# Patient Record
Sex: FEMALE | Race: WHITE | NOT HISPANIC OR LATINO
[De-identification: names, ages, dates, MRNs, and addresses within clinical notes are randomized per-mention and may not be internally consistent; named-entity substitution may affect disease eponyms.]

---

## 2016-03-12 RX ORDER — WARFARIN SODIUM 2.5 MG/1
1 TABLET ORAL
Qty: 0 | Refills: 0 | COMMUNITY
Start: 2016-03-12

## 2017-12-01 ENCOUNTER — APPOINTMENT (OUTPATIENT)
Dept: UROGYNECOLOGY | Facility: CLINIC | Age: 82
End: 2017-12-01
Payer: MEDICARE

## 2017-12-01 VITALS
DIASTOLIC BLOOD PRESSURE: 70 MMHG | HEIGHT: 62 IN | WEIGHT: 155 LBS | BODY MASS INDEX: 28.52 KG/M2 | SYSTOLIC BLOOD PRESSURE: 152 MMHG

## 2017-12-01 PROCEDURE — 99204 OFFICE O/P NEW MOD 45 MIN: CPT | Mod: 25

## 2017-12-01 PROCEDURE — 51701 INSERT BLADDER CATHETER: CPT

## 2017-12-04 ENCOUNTER — RECORD ABSTRACTING (OUTPATIENT)
Age: 82
End: 2017-12-04

## 2017-12-04 DIAGNOSIS — Z82.49 FAMILY HISTORY OF ISCHEMIC HEART DISEASE AND OTHER DISEASES OF THE CIRCULATORY SYSTEM: ICD-10-CM

## 2017-12-04 DIAGNOSIS — Z87.39 PERSONAL HISTORY OF OTHER DISEASES OF THE MUSCULOSKELETAL SYSTEM AND CONNECTIVE TISSUE: ICD-10-CM

## 2017-12-04 DIAGNOSIS — I10 ESSENTIAL (PRIMARY) HYPERTENSION: ICD-10-CM

## 2017-12-04 DIAGNOSIS — Z37.9 ENCOUNTER FOR FULL-TERM UNCOMPLICATED DELIVERY: ICD-10-CM

## 2017-12-04 DIAGNOSIS — Z82.5 FAMILY HISTORY OF ASTHMA AND OTHER CHRONIC LOWER RESPIRATORY DISEASES: ICD-10-CM

## 2017-12-04 DIAGNOSIS — I51.9 HEART DISEASE, UNSPECIFIED: ICD-10-CM

## 2017-12-04 DIAGNOSIS — Z83.511 FAMILY HISTORY OF GLAUCOMA: ICD-10-CM

## 2017-12-04 DIAGNOSIS — Z82.3 FAMILY HISTORY OF STROKE: ICD-10-CM

## 2017-12-04 DIAGNOSIS — K46.9 UNSPECIFIED ABDOMINAL HERNIA W/OUT OBSTRUCTION OR GANGRENE: ICD-10-CM

## 2017-12-04 DIAGNOSIS — Z87.09 PERSONAL HISTORY OF OTHER DISEASES OF THE RESPIRATORY SYSTEM: ICD-10-CM

## 2017-12-04 DIAGNOSIS — Z78.9 OTHER SPECIFIED HEALTH STATUS: ICD-10-CM

## 2017-12-04 LAB
APPEARANCE: CLEAR
BACTERIA UR CULT: NORMAL
BACTERIA: NEGATIVE
BILIRUB UR QL STRIP: NORMAL
BILIRUBIN URINE: NEGATIVE
BLOOD URINE: NEGATIVE
CLARITY UR: CLEAR
COLLECTION METHOD: NORMAL
COLOR: YELLOW
GLUCOSE QUALITATIVE U: NEGATIVE MG/DL
GLUCOSE UR-MCNC: NORMAL
HCG UR QL: 0.2 EU/DL
HGB UR QL STRIP.AUTO: NORMAL
HYALINE CASTS: 0 /LPF
KETONES UR-MCNC: NORMAL
KETONES URINE: NEGATIVE
LEUKOCYTE ESTERASE UR QL STRIP: NORMAL
LEUKOCYTE ESTERASE URINE: NEGATIVE
MICROSCOPIC-UA: NORMAL
NITRITE UR QL STRIP: NORMAL
NITRITE URINE: NEGATIVE
PH UR STRIP: 7.5
PH URINE: 7.5
PROT UR STRIP-MCNC: NORMAL
PROTEIN URINE: NEGATIVE MG/DL
RED BLOOD CELLS URINE: 1 /HPF
SP GR UR STRIP: 1.01
SPECIFIC GRAVITY URINE: 1.01
SQUAMOUS EPITHELIAL CELLS: 1 /HPF
UROBILINOGEN URINE: NEGATIVE MG/DL
WHITE BLOOD CELLS URINE: 0 /HPF

## 2017-12-04 RX ORDER — ACETAMINOPHEN 500 MG/1
TABLET ORAL
Refills: 0 | Status: ACTIVE | COMMUNITY

## 2017-12-04 RX ORDER — WARFARIN 4 MG/1
TABLET ORAL
Refills: 0 | Status: ACTIVE | COMMUNITY

## 2017-12-04 RX ORDER — FLUTICASONE PROPIONATE 110 UG/1
110 AEROSOL, METERED RESPIRATORY (INHALATION)
Refills: 0 | Status: ACTIVE | COMMUNITY

## 2017-12-04 RX ORDER — ATORVASTATIN CALCIUM 80 MG/1
TABLET, FILM COATED ORAL
Refills: 0 | Status: ACTIVE | COMMUNITY

## 2017-12-08 ENCOUNTER — APPOINTMENT (OUTPATIENT)
Dept: UROGYNECOLOGY | Facility: CLINIC | Age: 82
End: 2017-12-08
Payer: MEDICARE

## 2017-12-08 DIAGNOSIS — R39.11 HESITANCY OF MICTURITION: ICD-10-CM

## 2017-12-08 DIAGNOSIS — R33.9 RETENTION OF URINE, UNSPECIFIED: ICD-10-CM

## 2017-12-08 DIAGNOSIS — R35.0 FREQUENCY OF MICTURITION: ICD-10-CM

## 2017-12-08 PROCEDURE — 99214 OFFICE O/P EST MOD 30 MIN: CPT | Mod: GC

## 2017-12-19 ENCOUNTER — APPOINTMENT (OUTPATIENT)
Dept: UROGYNECOLOGY | Facility: CLINIC | Age: 82
End: 2017-12-19

## 2017-12-19 ENCOUNTER — INPATIENT (INPATIENT)
Facility: HOSPITAL | Age: 82
LOS: 4 days | Discharge: HOME | End: 2017-12-24
Attending: INTERNAL MEDICINE | Admitting: INTERNAL MEDICINE
Payer: MEDICARE

## 2017-12-19 VITALS
RESPIRATION RATE: 16 BRPM | TEMPERATURE: 100 F | SYSTOLIC BLOOD PRESSURE: 132 MMHG | OXYGEN SATURATION: 97 % | DIASTOLIC BLOOD PRESSURE: 77 MMHG | HEART RATE: 116 BPM

## 2017-12-19 DIAGNOSIS — A41.9 SEPSIS, UNSPECIFIED ORGANISM: ICD-10-CM

## 2017-12-19 DIAGNOSIS — K21.9 GASTRO-ESOPHAGEAL REFLUX DISEASE WITHOUT ESOPHAGITIS: ICD-10-CM

## 2017-12-19 DIAGNOSIS — I48.91 UNSPECIFIED ATRIAL FIBRILLATION: ICD-10-CM

## 2017-12-19 DIAGNOSIS — N93.9 ABNORMAL UTERINE AND VAGINAL BLEEDING, UNSPECIFIED: ICD-10-CM

## 2017-12-19 DIAGNOSIS — Z87.81 PERSONAL HISTORY OF (HEALED) TRAUMATIC FRACTURE: Chronic | ICD-10-CM

## 2017-12-19 DIAGNOSIS — J45.909 UNSPECIFIED ASTHMA, UNCOMPLICATED: ICD-10-CM

## 2017-12-19 DIAGNOSIS — I10 ESSENTIAL (PRIMARY) HYPERTENSION: ICD-10-CM

## 2017-12-19 DIAGNOSIS — R79.1 ABNORMAL COAGULATION PROFILE: ICD-10-CM

## 2017-12-19 DIAGNOSIS — N39.0 URINARY TRACT INFECTION, SITE NOT SPECIFIED: ICD-10-CM

## 2017-12-19 DIAGNOSIS — Z95.0 PRESENCE OF CARDIAC PACEMAKER: Chronic | ICD-10-CM

## 2017-12-19 LAB
ALBUMIN SERPL ELPH-MCNC: 4 G/DL — SIGNIFICANT CHANGE UP (ref 3.3–5)
ALP SERPL-CCNC: 85 U/L — SIGNIFICANT CHANGE UP (ref 40–120)
ALT FLD-CCNC: 18 U/L — SIGNIFICANT CHANGE UP (ref 4–33)
APPEARANCE UR: SIGNIFICANT CHANGE UP
APTT BLD: 49.4 SEC — HIGH (ref 27.5–37.4)
AST SERPL-CCNC: 26 U/L — SIGNIFICANT CHANGE UP (ref 4–32)
BACTERIA # UR AUTO: SIGNIFICANT CHANGE UP
BASE EXCESS BLDV CALC-SCNC: 0.5 MMOL/L — SIGNIFICANT CHANGE UP
BASOPHILS # BLD AUTO: 0.02 K/UL — SIGNIFICANT CHANGE UP (ref 0–0.2)
BASOPHILS NFR BLD AUTO: 0.2 % — SIGNIFICANT CHANGE UP (ref 0–2)
BILIRUB SERPL-MCNC: 0.4 MG/DL — SIGNIFICANT CHANGE UP (ref 0.2–1.2)
BILIRUB UR-MCNC: NEGATIVE — SIGNIFICANT CHANGE UP
BLOOD GAS VENOUS - CREATININE: 0.68 MG/DL — SIGNIFICANT CHANGE UP (ref 0.5–1.3)
BLOOD UR QL VISUAL: HIGH
BUN SERPL-MCNC: 12 MG/DL — SIGNIFICANT CHANGE UP (ref 7–23)
CALCIUM SERPL-MCNC: 8.9 MG/DL — SIGNIFICANT CHANGE UP (ref 8.4–10.5)
CHLORIDE BLDV-SCNC: 103 MMOL/L — SIGNIFICANT CHANGE UP (ref 96–108)
CHLORIDE SERPL-SCNC: 96 MMOL/L — LOW (ref 98–107)
CK MB BLD-MCNC: 1.38 NG/ML — SIGNIFICANT CHANGE UP (ref 1–4.7)
CK SERPL-CCNC: 71 U/L — SIGNIFICANT CHANGE UP (ref 25–170)
CO2 SERPL-SCNC: 23 MMOL/L — SIGNIFICANT CHANGE UP (ref 22–31)
COLOR SPEC: YELLOW — SIGNIFICANT CHANGE UP
CREAT SERPL-MCNC: 0.8 MG/DL — SIGNIFICANT CHANGE UP (ref 0.5–1.3)
EOSINOPHIL # BLD AUTO: 0.01 K/UL — SIGNIFICANT CHANGE UP (ref 0–0.5)
EOSINOPHIL NFR BLD AUTO: 0.1 % — SIGNIFICANT CHANGE UP (ref 0–6)
GAS PNL BLDV: 135 MMOL/L — LOW (ref 136–146)
GLUCOSE BLDV-MCNC: 116 — HIGH (ref 70–99)
GLUCOSE SERPL-MCNC: 107 MG/DL — HIGH (ref 70–99)
GLUCOSE UR-MCNC: NEGATIVE — SIGNIFICANT CHANGE UP
HCO3 BLDV-SCNC: 25 MMOL/L — SIGNIFICANT CHANGE UP (ref 20–27)
HCT VFR BLD CALC: 39.2 % — SIGNIFICANT CHANGE UP (ref 34.5–45)
HCT VFR BLDV CALC: 39.3 % — SIGNIFICANT CHANGE UP (ref 34.5–45)
HGB BLD-MCNC: 12.5 G/DL — SIGNIFICANT CHANGE UP (ref 11.5–15.5)
HGB BLDV-MCNC: 12.8 G/DL — SIGNIFICANT CHANGE UP (ref 11.5–15.5)
IMM GRANULOCYTES # BLD AUTO: 0.04 # — SIGNIFICANT CHANGE UP
IMM GRANULOCYTES NFR BLD AUTO: 0.4 % — SIGNIFICANT CHANGE UP (ref 0–1.5)
INR BLD: 5.01 — CRITICAL HIGH (ref 0.88–1.17)
KETONES UR-MCNC: NEGATIVE — SIGNIFICANT CHANGE UP
LACTATE BLDV-MCNC: 2.1 MMOL/L — HIGH (ref 0.5–2)
LEUKOCYTE ESTERASE UR-ACNC: HIGH
LIDOCAIN IGE QN: 24.5 U/L — SIGNIFICANT CHANGE UP (ref 7–60)
LYMPHOCYTES # BLD AUTO: 0.42 K/UL — LOW (ref 1–3.3)
LYMPHOCYTES # BLD AUTO: 4.5 % — LOW (ref 13–44)
MCHC RBC-ENTMCNC: 28.3 PG — SIGNIFICANT CHANGE UP (ref 27–34)
MCHC RBC-ENTMCNC: 31.9 % — LOW (ref 32–36)
MCV RBC AUTO: 88.7 FL — SIGNIFICANT CHANGE UP (ref 80–100)
MONOCYTES # BLD AUTO: 0.68 K/UL — SIGNIFICANT CHANGE UP (ref 0–0.9)
MONOCYTES NFR BLD AUTO: 7.3 % — SIGNIFICANT CHANGE UP (ref 2–14)
MUCOUS THREADS # UR AUTO: SIGNIFICANT CHANGE UP
NEUTROPHILS # BLD AUTO: 8.12 K/UL — HIGH (ref 1.8–7.4)
NEUTROPHILS NFR BLD AUTO: 87.5 % — HIGH (ref 43–77)
NITRITE UR-MCNC: POSITIVE — HIGH
NRBC # FLD: 0 — SIGNIFICANT CHANGE UP
PCO2 BLDV: 33 MMHG — LOW (ref 41–51)
PH BLDV: 7.47 PH — HIGH (ref 7.32–7.43)
PH UR: 6.5 — SIGNIFICANT CHANGE UP (ref 4.6–8)
PLATELET # BLD AUTO: 322 K/UL — SIGNIFICANT CHANGE UP (ref 150–400)
PMV BLD: 9.4 FL — SIGNIFICANT CHANGE UP (ref 7–13)
PO2 BLDV: 48 MMHG — HIGH (ref 35–40)
POTASSIUM BLDV-SCNC: 3.7 MMOL/L — SIGNIFICANT CHANGE UP (ref 3.4–4.5)
POTASSIUM SERPL-MCNC: 4.1 MMOL/L — SIGNIFICANT CHANGE UP (ref 3.5–5.3)
POTASSIUM SERPL-SCNC: 4.1 MMOL/L — SIGNIFICANT CHANGE UP (ref 3.5–5.3)
PROT SERPL-MCNC: 7.6 G/DL — SIGNIFICANT CHANGE UP (ref 6–8.3)
PROT UR-MCNC: 20 MG/DL — SIGNIFICANT CHANGE UP
PROTHROM AB SERPL-ACNC: 59.6 SEC — HIGH (ref 9.8–13.1)
RBC # BLD: 4.42 M/UL — SIGNIFICANT CHANGE UP (ref 3.8–5.2)
RBC # FLD: 13.5 % — SIGNIFICANT CHANGE UP (ref 10.3–14.5)
RBC CASTS # UR COMP ASSIST: HIGH (ref 0–?)
SAO2 % BLDV: 84.3 % — SIGNIFICANT CHANGE UP (ref 60–85)
SODIUM SERPL-SCNC: 136 MMOL/L — SIGNIFICANT CHANGE UP (ref 135–145)
SP GR SPEC: 1.01 — SIGNIFICANT CHANGE UP (ref 1–1.04)
TROPONIN T SERPL-MCNC: < 0.06 NG/ML — SIGNIFICANT CHANGE UP (ref 0–0.06)
UROBILINOGEN FLD QL: NORMAL MG/DL — SIGNIFICANT CHANGE UP
WBC # BLD: 9.29 K/UL — SIGNIFICANT CHANGE UP (ref 3.8–10.5)
WBC # FLD AUTO: 9.29 K/UL — SIGNIFICANT CHANGE UP (ref 3.8–10.5)
WBC CLUMPS #/AREA URNS HPF: PRESENT — HIGH (ref 0–?)
WBC UR QL: >50 — HIGH (ref 0–?)

## 2017-12-19 PROCEDURE — 71020: CPT | Mod: 26

## 2017-12-19 PROCEDURE — 99222 1ST HOSP IP/OBS MODERATE 55: CPT | Mod: GC

## 2017-12-19 RX ORDER — ACETAMINOPHEN 500 MG
650 TABLET ORAL ONCE
Qty: 0 | Refills: 0 | Status: COMPLETED | OUTPATIENT
Start: 2017-12-19 | End: 2017-12-19

## 2017-12-19 RX ORDER — ACETAMINOPHEN 500 MG
650 TABLET ORAL EVERY 6 HOURS
Qty: 0 | Refills: 0 | Status: DISCONTINUED | OUTPATIENT
Start: 2017-12-19 | End: 2017-12-24

## 2017-12-19 RX ORDER — VANCOMYCIN HCL 1 G
750 VIAL (EA) INTRAVENOUS EVERY 12 HOURS
Qty: 0 | Refills: 0 | Status: DISCONTINUED | OUTPATIENT
Start: 2017-12-19 | End: 2017-12-19

## 2017-12-19 RX ORDER — VANCOMYCIN HCL 1 G
1000 VIAL (EA) INTRAVENOUS ONCE
Qty: 0 | Refills: 0 | Status: COMPLETED | OUTPATIENT
Start: 2017-12-19 | End: 2017-12-19

## 2017-12-19 RX ORDER — VANCOMYCIN HCL 1 G
1000 VIAL (EA) INTRAVENOUS EVERY 12 HOURS
Qty: 0 | Refills: 0 | Status: DISCONTINUED | OUTPATIENT
Start: 2017-12-19 | End: 2017-12-19

## 2017-12-19 RX ORDER — FLUTICASONE PROPIONATE 220 MCG
1 AEROSOL WITH ADAPTER (GRAM) INHALATION
Qty: 0 | Refills: 0 | Status: DISCONTINUED | OUTPATIENT
Start: 2017-12-19 | End: 2017-12-24

## 2017-12-19 RX ORDER — IBUPROFEN 200 MG
400 TABLET ORAL ONCE
Qty: 0 | Refills: 0 | Status: COMPLETED | OUTPATIENT
Start: 2017-12-19 | End: 2017-12-19

## 2017-12-19 RX ORDER — ATORVASTATIN CALCIUM 80 MG/1
10 TABLET, FILM COATED ORAL AT BEDTIME
Qty: 0 | Refills: 0 | Status: DISCONTINUED | OUTPATIENT
Start: 2017-12-19 | End: 2017-12-24

## 2017-12-19 RX ORDER — ONDANSETRON 8 MG/1
4 TABLET, FILM COATED ORAL ONCE
Qty: 0 | Refills: 0 | Status: COMPLETED | OUTPATIENT
Start: 2017-12-19 | End: 2017-12-19

## 2017-12-19 RX ORDER — AZTREONAM 2 G
2000 VIAL (EA) INJECTION ONCE
Qty: 0 | Refills: 0 | Status: COMPLETED | OUTPATIENT
Start: 2017-12-19 | End: 2017-12-19

## 2017-12-19 RX ORDER — SODIUM CHLORIDE 9 MG/ML
1000 INJECTION INTRAMUSCULAR; INTRAVENOUS; SUBCUTANEOUS ONCE
Qty: 0 | Refills: 0 | Status: COMPLETED | OUTPATIENT
Start: 2017-12-19 | End: 2017-12-19

## 2017-12-19 RX ORDER — SODIUM CHLORIDE 9 MG/ML
1000 INJECTION INTRAMUSCULAR; INTRAVENOUS; SUBCUTANEOUS
Qty: 0 | Refills: 0 | Status: COMPLETED | OUTPATIENT
Start: 2017-12-19 | End: 2017-12-20

## 2017-12-19 RX ORDER — PANTOPRAZOLE SODIUM 20 MG/1
40 TABLET, DELAYED RELEASE ORAL
Qty: 0 | Refills: 0 | Status: DISCONTINUED | OUTPATIENT
Start: 2017-12-19 | End: 2017-12-24

## 2017-12-19 RX ORDER — AZTREONAM 2 G
1000 VIAL (EA) INJECTION EVERY 8 HOURS
Qty: 0 | Refills: 0 | Status: DISCONTINUED | OUTPATIENT
Start: 2017-12-19 | End: 2017-12-20

## 2017-12-19 RX ORDER — ONDANSETRON 8 MG/1
4 TABLET, FILM COATED ORAL EVERY 6 HOURS
Qty: 0 | Refills: 0 | Status: DISCONTINUED | OUTPATIENT
Start: 2017-12-19 | End: 2017-12-24

## 2017-12-19 RX ORDER — ACETAMINOPHEN 500 MG
1000 TABLET ORAL ONCE
Qty: 0 | Refills: 0 | Status: COMPLETED | OUTPATIENT
Start: 2017-12-19 | End: 2017-12-19

## 2017-12-19 RX ADMIN — Medication 400 MILLIGRAM(S): at 19:07

## 2017-12-19 RX ADMIN — Medication 650 MILLIGRAM(S): at 15:42

## 2017-12-19 RX ADMIN — SODIUM CHLORIDE 1000 MILLILITER(S): 9 INJECTION INTRAMUSCULAR; INTRAVENOUS; SUBCUTANEOUS at 10:08

## 2017-12-19 RX ADMIN — Medication 400 MILLIGRAM(S): at 10:42

## 2017-12-19 RX ADMIN — SODIUM CHLORIDE 1000 MILLILITER(S): 9 INJECTION INTRAMUSCULAR; INTRAVENOUS; SUBCUTANEOUS at 10:40

## 2017-12-19 RX ADMIN — ONDANSETRON 4 MILLIGRAM(S): 8 TABLET, FILM COATED ORAL at 10:52

## 2017-12-19 RX ADMIN — Medication 400 MILLIGRAM(S): at 18:16

## 2017-12-19 RX ADMIN — Medication 50 MILLIGRAM(S): at 22:42

## 2017-12-19 RX ADMIN — Medication 250 MILLIGRAM(S): at 10:41

## 2017-12-19 RX ADMIN — ATORVASTATIN CALCIUM 10 MILLIGRAM(S): 80 TABLET, FILM COATED ORAL at 22:42

## 2017-12-19 RX ADMIN — PANTOPRAZOLE SODIUM 40 MILLIGRAM(S): 20 TABLET, DELAYED RELEASE ORAL at 17:38

## 2017-12-19 RX ADMIN — SODIUM CHLORIDE 75 MILLILITER(S): 9 INJECTION INTRAMUSCULAR; INTRAVENOUS; SUBCUTANEOUS at 17:38

## 2017-12-19 RX ADMIN — SODIUM CHLORIDE 75 MILLILITER(S): 9 INJECTION INTRAMUSCULAR; INTRAVENOUS; SUBCUTANEOUS at 21:28

## 2017-12-19 RX ADMIN — Medication 50 MILLIGRAM(S): at 13:20

## 2017-12-19 NOTE — CONSULT NOTE ADULT - ASSESSMENT
82  with urosepsis, with light vaginal spotting which occurred prior to pessary removal 82  presenting with sepsis, possible  in origin. Denies any urinary complaints. Patient followed for prolapse by Dr. Preciado with recent pessary placement, followed by removal of pessary for vaginal bleeding. 82  presenting with fever, nausea, emesis and positive. Denies any urinary complaints. Patient followed for prolapse by Dr. Preciado with recent pessary placement, followed by removal of pessary for vaginal bleeding.       Fellow Note: Patient seen and examined. Agree with above. 81 y/o with multiple medical problems and uterovaginal prolapse presents to ED with  nausea, emesis, dizziness. She had a pessary placed ,  she had vaginal spotting and removed pessary, she had minimal vaginal bleeding since removal of pessary. She denies urinary complaints, denies frequency, urgency, incomplete emptying, dysuia., unclear if bleeding from urine or vagina.  This morning she called the office c/o of dizziness and was advised to go to the ED. Upon arrival to ED patient febrile, tachycardic with minimal vaginal bleeding, stable H/H, and positive u/a (by cath). Admitted for management of febrile UTI. On exam, benign abdomen, no CVA tenderness, vaginal exam posterior vaginal wall erosion, no active bleeding, urethral caruncle. Recommend treatment of UTI with admission to medicine.     Of note, when patient initially seen in office, unable to void with 350cc in bladder, able to void with pessary in place. Recommend patient manually reduce prolapse to be able to void. Recommend PVR check after void. If elevated PVR recommend indwelling catheter. Plan discussed with patient. All questions were answered. d/w Dr. Ozzy De Guzman

## 2017-12-19 NOTE — H&P ADULT - PROBLEM SELECTOR PLAN 2
-on coumadin for afib, will hold coumadin in setting of supratherapeutic INR and vaginal spotting   -if develops persistent vaginal bleeding will give vitamin K  -no need for prophylactic a/c

## 2017-12-19 NOTE — H&P ADULT - NSHPREVIEWOFSYSTEMS_GEN_ALL_CORE
REVIEW OF SYSTEMS:    CONSTITUTIONAL: No weakness, fevers or chills  EYES/ENT: No visual changes;  no throat pain   NECK: No pain or stiffness  RESPIRATORY: No cough, wheezing, hemoptysis; No shortness of breath  CARDIOVASCULAR: No chest pain or palpitations  GASTROINTESTINAL: No abdominal or epigastric pain. No nausea, vomiting, or hematemesis; No diarrhea or constipation. No melena or hematochezia.  GENITOURINARY: No dysuria, change in frequency or hematuria  NEUROLOGICAL: No numbness or weakness  SKIN: No itching, burning, rashes, or lesions   All other review of systems is negative unless indicated above. REVIEW OF SYSTEMS:    CONSTITUTIONAL:(+) weakness and fever   EYES/ENT: No visual changes;  no throat pain   NECK: No pain or stiffness  RESPIRATORY: No cough, wheezing, hemoptysis; No shortness of breath  CARDIOVASCULAR: No chest pain or palpitations  GASTROINTESTINAL: No abdominal or epigastric pain. No hematemesis; No diarrhea or constipation. No melena or hematochezia. (+) NBNB N/V  GENITOURINARY: No dysuria, change in frequency, (+) vaginal spotting   NEUROLOGICAL: No numbness   SKIN: No itching, burning, rashes, or lesions   All other review of systems is negative unless indicated above.

## 2017-12-19 NOTE — ED PROVIDER NOTE - PMH
Asthma    Atrial Fibrillation    Fatty liver    GERD (gastroesophageal reflux disease)    Hypertension    Lightheadedness

## 2017-12-19 NOTE — ED PROVIDER NOTE - PROGRESS NOTE DETAILS
urogyn resident dr. singh came down to evaluate patient-agree with plan to treat for sepsis, no intervention by them at this time. uti noted, already tx with abx, no vomiting in ed, +supratherapeutic inr but no active bleeding at this time and h/h ok, will hold any further coumadin dosing at this time and monitor. d/w dr. titus, requesting admission to dr. muhammad, voicemail left for dr. muhammad, awaiting call back.

## 2017-12-19 NOTE — H&P ADULT - PSH
Artificial cardiac pacemaker  10/2014  H/O fracture of tibia  required prasad and screws, April 2015  S/P cataract extraction  bilateral, 2013  S/P hernia repair  left groin, 2010

## 2017-12-19 NOTE — CONSULT NOTE ADULT - SUBJECTIVE AND OBJECTIVE BOX
HISTORY OF PRESENT ILLNESS  Ms. Martinez is a very pleasant 82 year old woman well known to our service  with history of paroxysmal atrial fibrillation, sick sinus syndrome, Biotronik dual chamber pacemaker, non obstructive CAD on cardiac cath 3/16 with historically NL LV function on recent office TTE 6/17  pulmonary fibrosis, hypertension, who presents with vaginal bleeding in the setting of supratherapeutic INR as well as  generalized weakness, emesis, dizziness x3day. She had a pessary placed approx 1 week ago by Dr. Preciado after which she developed progressively worsening vaginal bleeding.   She self removed the pessary per UroGyn recs but as she persisted in having vaginal spotting as well as weakness and dizziness, she came to the ER where she was found to be  febrile and tachycardic with a positive UA .  She denies any anginal CP, SMITH, palpitations, or syncope.     PAST MEDICAL & SURGICAL HISTORY:  Asthma  Fatty liver  GERD (gastroesophageal reflux disease)  Lightheadedness  Atrial Fibrillation  Hypertension  H/O fracture of tibia: required prasad and screws, April 2015  Artificial cardiac pacemaker: 10/2014  S/P cataract extraction: bilateral, 2013  S/P hernia repair: left groin, 2010    	    MEDICATIONS:  aztreonam  IVPB 1000 milliGRAM(s) IV Intermittent every 8 hours  fluticasone propionate   110 MICROgram(s) HFA Inhaler 1 Puff(s) Inhalation two times a day  acetaminophen   Tablet 650 milliGRAM(s) Oral every 6 hours PRN  ondansetron Injectable 4 milliGRAM(s) IV Push every 6 hours PRN  pantoprazole    Tablet 40 milliGRAM(s) Oral before breakfast  atorvastatin 10 milliGRAM(s) Oral at bedtime  sodium chloride 0.9%. 1000 milliLiter(s) IV Continuous <Continuous>      Allergies  adhesives (Rash)  Augmentin (Rash)  clavulanate (Rash)  Levaquin (Rash)          FAMILY HISTORY:  no premature CAD  Family history of abdominal aortic aneurysm  Family history of stroke  Family history of hypertension (Mother): mother      SOCIAL HISTORY:    [ -] Non-smoker  [ ] Smoker  [- ] Alcohol      REVIEW OF SYSTEMS:    vaginal bleeding  dizziness  weakness   nausea   otherwise negative   PHYSICAL EXAM:  T(C): 38.6 (12-19-17 @ 15:27), Max: 38.9 (12-19-17 @ 10:02)  HR: 113 (12-19-17 @ 15:27) (102 - 116)  BP: 138/77 (12-19-17 @ 15:27) (132/77 - 138/77)  RR: 14 (12-19-17 @ 15:27) (13 - 16)  SpO2: 100% (12-19-17 @ 15:27) (97% - 100%)  Wt(kg): --  I&O's Summary      Appearance: Normal	  HEENT:   NCAT  Cardiovascular: Normal S1 S2, tacycardic No JVD, No murmurs, No edema  Respiratory: Lungs clear to auscultation	  Gastrointestinal:  Soft, Non-tender, + BS	  Skin: No rashes, No ecchymoses, No cyanosis	  Neurologic: Non-focal  Extremities: warm dry no edema      TELEMETRY: n/a  	    ECG:  	sinus tach twi lead v5    RADIOLOGY: CXR - clear   	  	  LABS:	 	    CARDIAC MARKERS:  troponin <.06    CKMB: 1.38 ng/mL (12-19 @ 10:25)                            12.5   9.29  )-----------( 322      ( 19 Dec 2017 10:25 )             39.2       12-19    136  |  96<L>  |  12  ----------------------------<  107<H>  4.1   |  23  |  0.80    Ca    8.9      19 Dec 2017 10:25    TPro  7.6  /  Alb  4.0  /  TBili  0.4  /  DBili  x   /  AST  26  /  ALT  18  /  AlkPhos  85  12-19    INR 5.1     < from: Cardiac Cath Lab - Adult (03.25.16 @ 11:03) >  CORONARY VESSELS: The coronary circulation is left dominant.  LM:   --  LM: Normal.  LAD:   --  Proximal LAD: Normal.  --  Mid LAD: Angiography showed minor luminal irregularities with no flow  limiting lesions.  --  Distal LAD: Angiography showed minor luminal irregularities with no  flow limiting lesions.  --  D1: Angiography showed minor luminal irregularities with no flow  limiting lesions.  CX:   --  Circumflex: Normal.  --  OM1: Normal.  RCA:   --  RCA: Normal. Non-dominant RCA.  COMPLICATIONS: There were no complications.  DIAGNOSTIC RECOMMENDATIONS: Medical management and aggressive risk factor  modification is recommended.      TTE: in office 6/17   Conclusions:   1. Normal left ventricular size with normal systolic function.   2. Mild diastolic dysfunction.   3. Normal right ventricular size and function. Device wire in the right heart.   4. Minimal aortic regurgitation.       ASSESSMENT/PLAN: 	  Ms. Martinez is a very pleasant 82 year old woman well known to our service  with history of paroxysmal atrial fibrillation, sick sinus syndrome, Biotronik dual chamber pacemaker, Non obstructive CAD on cardiac cath 3/16 with historically NL LV function on TTE 3/16,  pulmonary fibrosis, hypertension, who presents with vaginal bleeding in the setting of supratherapeutic INR as well as  generalized weakness, emesis, dizziness x3day. She had a pessary placed approx 1 week ago by Dr. Preciado after which she developed progressively worsening vaginal bleeding.   She self removed the pessary per UroGyn recs but as she persisted in having vaginal spotting as well as weakness and dizziness, she came to the ER where she was found to be  febrile and tachycardic with a positive UA .  She denies any anginal CP, SMITH, palpitations, or syncope    - Dizziness is likely 2/2 febrile illness up to 102 / UTI/pyelonephritis    however, could consider CT head if dizziness persists given supratherapeutic INR though currently patient with no focal deficits  - IV abx per ID - f/u cultures   - Interrogate Biotoronik PPM tomorrow to r/o cardiac etiology of dizziness  - PAF - Coumadin on hold given INR 5 (h/h stable)  - final recs pending above  - patient to f/u with Dr Simon for cardiology upon DC    Cara Jefferson [facility]  O:  [phone]  P: [phone]

## 2017-12-19 NOTE — ED PROVIDER NOTE - MEDICAL DECISION MAKING DETAILS
82F pmhx afib on warfarin, htn, pulm fibrosis, here with light-headedness + vomiting x 3 days with acute worsening last night in the setting of vaginal bleed x 3 days. Pt also feeling generalized weakness but denies numbness tingling, changes in vision, balance disturbances, headache. Plan for cmp, cbc, cxr, ua, urine culture, ekg, cardiac enzymes. 82F pmhx afib on warfarin, htn, pulm fibrosis, here with light-headedness + vomiting x 3 days with acute worsening last night in the setting of vaginal bleed x 3 days. Pt also feeling generalized weakness but denies numbness tingling, changes in vision, balance disturbances, headache. No focal deficits on exam. Plan for cmp, cbc, cxr, ua, urine culture, ekg, cardiac enzymes and admission for likely

## 2017-12-19 NOTE — CONSULT NOTE ADULT - PROBLEM SELECTOR RECOMMENDATION 9
-pt to be admitted to medicine for urosepsis per ED physician  -urogyn fellow to see and examine patient after admission   -no acute GYN intervention at this time  -follow up as outpatient with urogyn Dr. Preciado  d[id number]

## 2017-12-19 NOTE — ED PROVIDER NOTE - CARE PLAN
Principal Discharge DX:	Urinary tract infection with hematuria, site unspecified  Secondary Diagnosis:	Sepsis, due to unspecified organism  Secondary Diagnosis:	Supratherapeutic INR

## 2017-12-19 NOTE — H&P ADULT - PROBLEM SELECTOR PLAN 5
-holding coumadin as stated above,   -will hold cardizem in setting of sinus tach 2/2 sepsis, can give IV cardizem if patient goes into afib, will resume cardizem tomorrow if clinically improved

## 2017-12-19 NOTE — ED ADULT TRIAGE NOTE - CHIEF COMPLAINT QUOTE
C/o vomiting x 1 day and dizziness and weakness since this morning. Pt reports having a pessary and vaginal bleeding since Friday. Denies abdominal pain/diarrhea. PMH Pacemaker, Afib on coumadin, HTN.

## 2017-12-19 NOTE — ED PROVIDER NOTE - ATTENDING CONTRIBUTION TO CARE
82f presents with lightheadedness and vomiting. Symptoms started 3 days ago, felt lightheaded mostly at night, then yesterday started having nonbloody nonbilious emesis, 3-4x. Also notes she has had bleeding with urination over last few days. Had pessary placed by urogyn which she was instructed to remove 2/2 bleeding, and did. No associated ha, cp, sob, abdominal pain, diarrhea, dysuria.   exam  GEN - NAD;  A+O x3   HEAD - NC/AT   EYES- PERRL, EOMI  ENT: Airway patent, dry mm, Oral cavity and pharynx normal.     NECK: Neck supple, non-tender without lymphadenopathy, no masses.  PULMONARY - CTA b/l, symmetric breath sounds.   CARDIAC -s1s2, RRR, no M,G,R  ABDOMEN - +BS, ND, NT, soft, no guarding, no rebound, no masses   -perf by dr. ramirez, chaperoned by me, +urethral irritation, no active bleeding from urethra or vagina  BACK - no CVA tenderness, Normal  spine   EXTREMITIES - FROM, symmetric pulses, capillary refill < 2 seconds, no edema   SKIN - pale, no rash or bruising   NEUROLOGIC - alert, speech clear, no focal deficits  PSYCH -nl mood/affect, nl insight.  a/p-82f presents with hematuria, fever, lightheadedness, vomiting, urethral irritation noted on exam, no active bleeding, abdomen nontender, will check labs, ekg, cxr, ua, hydrate, empiric abx, antiemetics, monitor, reass. 82f presents with lightheadedness and vomiting. Symptoms started 3 days ago, felt lightheaded mostly at night, then yesterday started having nonbloody nonbilious emesis, 3-4x. Also notes she has had bleeding with urination over last few days. Had pessary placed by urogyn which she was instructed to remove 2/2 bleeding, and did. No associated ha, cp, sob, abdominal pain, diarrhea, dysuria.   exam  GEN - NAD;  A+O x3   HEAD - NC/AT   EYES- PERRL, EOMI  ENT: Airway patent, dry mm, Oral cavity and pharynx normal.     NECK: Neck supple, non-tender without lymphadenopathy, no masses.  PULMONARY - CTA b/l, symmetric breath sounds.   CARDIAC -s1s2, tachy RR, no M,G,R  ABDOMEN - +BS, ND, NT, soft, no guarding, no rebound, no masses   -perf by dr. feliciano, chaperoned by me, +urethral irritation, no active bleeding from urethra or vagina  BACK - no CVA tenderness, Normal  spine   EXTREMITIES - FROM, symmetric pulses, capillary refill < 2 seconds, no edema   SKIN - pale, no rash or bruising   NEUROLOGIC - alert, speech clear, no focal deficits  PSYCH -nl mood/affect, nl insight.  a/p-82f presents with hematuria, fever, lightheadedness, vomiting, urethral irritation noted on exam, no active bleeding, abdomen nontender, will check labs, ekg, cxr, ua, hydrate, empiric abx, antiemetics, monitor, reass.

## 2017-12-19 NOTE — ED ADULT NURSE NOTE - OBJECTIVE STATEMENT
82y F A&OX4 c/o vomitting and nausea since last night , pt had a meal last night at 6pm but could not keep any food down . pt also complains of vaginal bleeding and had an appointment with gyno today . pt reports going through 2 pads per day. pt denies any pain and diarrhea but does report neusea and vomitting. pt has a rectal temp of 102.0 labs and cultures collected and sent. pt has hx of Afib and is placed on 3 lead hr monitor. pt is calm and cooperative, awaiting on urine. family is at bedside and initial assessment was completed with resident at bedside

## 2017-12-19 NOTE — H&P ADULT - PROBLEM SELECTOR PLAN 1
-febrile, tachy with positive UTI, c/w vanc, zosyn, pre-medicate with benadryl before zosyn and infuse initial bolus dose over 4 hours   -f/u urine and blood cultures   -c/w IVF  -zofran for nausea and tylenol for fever

## 2017-12-19 NOTE — H&P ADULT - NSHPLABSRESULTS_GEN_ALL_CORE
12.5   9.29  )-----------( 322      ( 19 Dec 2017 10:25 )             39.2           136  |  96<L>  |  12  ----------------------------<  107<H>  4.1   |  23  |  0.80    Ca    8.9      19 Dec 2017 10:25    TPro  7.6  /  Alb  4.0  /  TBili  0.4  /  DBili  x   /  AST  26  /  ALT  18  /  AlkPhos  85                Urinalysis Basic - ( 19 Dec 2017 09:53 )    Color: YELLOW / Appearance: HAZY / S.014 / pH: 6.5  Gluc: NEGATIVE / Ketone: NEGATIVE  / Bili: NEGATIVE / Urobili: NORMAL mg/dL   Blood: SMALL / Protein: 20 mg/dL / Nitrite: POSITIVE   Leuk Esterase: LARGE / RBC: 5-10 / WBC >50   Sq Epi: x / Non Sq Epi: x / Bacteria: MOD        PT/INR - ( 19 Dec 2017 10:25 )   PT: 59.6 SEC;   INR: 5.01          PTT - ( 19 Dec 2017 10:25 )  PTT:49.4 SEC    Lactate Trend      CARDIAC MARKERS ( 19 Dec 2017 10:25 )  x     / < 0.06 ng/mL / 71 u/L / 1.38 ng/mL / x            CAPILLARY BLOOD GLUCOSE      POCT Blood Glucose.: 106 mg/dL (19 Dec 2017 09:32)              Cultures:    Radiology:    EKG: 12.5   9.29  )-----------( 322      ( 19 Dec 2017 10:25 )             39.2           136  |  96<L>  |  12  ----------------------------<  107<H>  4.1   |  23  |  0.80    Ca    8.9      19 Dec 2017 10:25    TPro  7.6  /  Alb  4.0  /  TBili  0.4  /  DBili  x   /  AST  26  /  ALT  18  /  AlkPhos  85                Urinalysis Basic - ( 19 Dec 2017 09:53 )    Color: YELLOW / Appearance: HAZY / S.014 / pH: 6.5  Gluc: NEGATIVE / Ketone: NEGATIVE  / Bili: NEGATIVE / Urobili: NORMAL mg/dL   Blood: SMALL / Protein: 20 mg/dL / Nitrite: POSITIVE   Leuk Esterase: LARGE / RBC: 5-10 / WBC >50   Sq Epi: x / Non Sq Epi: x / Bacteria: MOD        PT/INR - ( 19 Dec 2017 10:25 )   PT: 59.6 SEC;   INR: 5.01          PTT - ( 19 Dec 2017 10:25 )  PTT:49.4 SEC    Lactate Trend      CARDIAC MARKERS ( 19 Dec 2017 10:25 )  x     / < 0.06 ng/mL / 71 u/L / 1.38 ng/mL / x            CAPILLARY BLOOD GLUCOSE      POCT Blood Glucose.: 106 mg/dL (19 Dec 2017 09:32)      Cultures: blood and urine cultures sent     Radiology: interpreted by me: no focal consolidation or effusion     EKG: interpreted by me: sinus tach with no ischemic changes

## 2017-12-19 NOTE — CONSULT NOTE ADULT - SUBJECTIVE AND OBJECTIVE BOX
OBGYN Consult   82y  postmenopausal female (menopause at approx age 53) presented to ED with generalized weakness and was found to have urosepsis. Last week she had a pessary placed for pelvic organ prolapse as an outpatient with Dr. Preciado. On Friday she noticed some light spotting from the vagina which became heavier on . She also noticed some blood in her urine. She was advised to remove the pessary, which she did herself on . She did not have any bleeding on Monday but noticed spotting again this morning. She called the Urogyn office with complaints of light spotting and weakness/dizziness and was sent to the ED where she was found to be febrile and tachycardic with positive UA. Pt denies any dysuria. Denies urinary frequency or lower pelvic pain. Denies CP/SOB. Denies NV. Denies fevers/chills.        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  S/P cataract extraction: bilateral,   S/P hernia repair: left groin,     Allergies    adhesives (Rash)  Augmentin (Rash)  clavulanate (Rash)  Levaquin (Rash)      Home medications: atorvastatin, losartan, diltiazem, coumadin, tylenol, flovent    FAMILY HISTORY:  Family history of abdominal aortic aneurysm  Family history of stroke  Family history of hypertension: mother       Vital Signs Last 24 Hrs  T(C): 38.9 (19 Dec 2017 10:02), Max: 38.9 (19 Dec 2017 10:02)  T(F): 102 (19 Dec 2017 10:02), Max: 102 (19 Dec 2017 10:02)  HR: 102 (19 Dec 2017 10:02) (102 - 116)  BP: 133/70 (19 Dec 2017 10:02) (132/77 - 133/70)  RR: 13 (19 Dec 2017 10:02) (13 - 16)  SpO2: 100% (19 Dec 2017 10:02) (97% - 100%)    PHYSICAL EXAM:      Constitutional: alert and oriented x 3    Respiratory: clear    Cardiovascular: regular rate and rhythm    Gastrointestinal: soft, non tender, non distended    Genitourinary: irritation and redness of urethra and surrounding tissue, no excoriation or laceration of vagina noted, uterus nontender, adnexa nontender and non palpable bilaterally        LABS:                        12.5   9.29  )-----------( 322      ( 19 Dec 2017 10:25 )             39.2         136  |  96<L>  |  12  ----------------------------<  107<H>  4.1   |  23  |  0.80    Ca    8.9      19 Dec 2017 10:25    TPro  7.6  /  Alb  4.0  /  TBili  0.4  /  DBili  x   /  AST  26  /  ALT  18  /  AlkPhos  85      PT/INR - ( 19 Dec 2017 10:25 )   PT: 59.6 SEC;   INR: 5.01          PTT - ( 19 Dec 2017 10:25 )  PTT:49.4 SEC  Urinalysis Basic - ( 19 Dec 2017 09:53 )    Color: YELLOW / Appearance: HAZY / S.014 / pH: 6.5  Gluc: NEGATIVE / Ketone: NEGATIVE  / Bili: NEGATIVE / Urobili: NORMAL mg/dL   Blood: SMALL / Protein: 20 mg/dL / Nitrite: POSITIVE   Leuk Esterase: LARGE / RBC: 5-10 / WBC >50   Sq Epi: x / Non Sq Epi: x / Bacteria: MOD OBGYN Consult   82y  postmenopausal female (menopause at approx age 53) presented to ED with generalized weakness, emesis, dizziness. She had a pessary placed approx 1 week ago by Dr. Preciado. On Friday she noticed some light spotting from the vagina which became heavier on . She was advised to remove the pessary, which she did herself on . She did not have any bleeding on Monday but noticed spotting again this morning. She called the Urogyn office with complaints of light spotting and weakness/dizziness and was sent to the ED where she was found to be febrile and tachycardic with positive UA. Pt denies any dysuria or change in urinary symptoms. Denies urinary frequency or lower pelvic pain. Denies CP/SOB. Denies NV. Denies fevers/chills.    Upon arrival to ED pt found to be febrile and tachycardiac with positive U/A- patient being treated for urosepsis.    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  S/P cataract extraction: bilateral,   S/P hernia repair: left groin,     Allergies    adhesives (Rash)  Augmentin (Rash)  clavulanate (Rash)  Levaquin (Rash)      Home medications: atorvastatin, losartan, diltiazem, coumadin, tylenol, flovent    FAMILY HISTORY:  Family history of abdominal aortic aneurysm  Family history of stroke  Family history of hypertension: mother       Vital Signs Last 24 Hrs  T(C): 38.9 (19 Dec 2017 10:02), Max: 38.9 (19 Dec 2017 10:02)  T(F): 102 (19 Dec 2017 10:02), Max: 102 (19 Dec 2017 10:02)  HR: 102 (19 Dec 2017 10:02) (102 - 116)  BP: 133/70 (19 Dec 2017 10:02) (132/77 - 133/70)  RR: 13 (19 Dec 2017 10:02) (13 - 16)  SpO2: 100% (19 Dec 2017 10:02) (97% - 100%)    PHYSICAL EXAM:      Constitutional: alert and oriented x 3    Respiratory: clear    Cardiovascular: regular rate and rhythm    Gastrointestinal: soft, non tender, non distended    Genitourinary: irritation and redness of urethra and surrounding tissue, no excoriation or laceration of vagina noted, uterus nontender, adnexa nontender and non palpable bilaterally, no vaginal bleeding, no fistula noted        LABS:                        12.5   9.29  )-----------( 322      ( 19 Dec 2017 10:25 )             39.2         136  |  96<L>  |  12  ----------------------------<  107<H>  4.1   |  23  |  0.80    Ca    8.9      19 Dec 2017 10:25    TPro  7.6  /  Alb  4.0  /  TBili  0.4  /  DBili  x   /  AST  26  /  ALT  18  /  AlkPhos  85      PT/INR - ( 19 Dec 2017 10:25 )   PT: 59.6 SEC;   INR: 5.01          PTT - ( 19 Dec 2017 10:25 )  PTT:49.4 SEC  Urinalysis Basic - ( 19 Dec 2017 09:53 )    Color: YELLOW / Appearance: HAZY / S.014 / pH: 6.5  Gluc: NEGATIVE / Ketone: NEGATIVE  / Bili: NEGATIVE / Urobili: NORMAL mg/dL   Blood: SMALL / Protein: 20 mg/dL / Nitrite: POSITIVE   Leuk Esterase: LARGE / RBC: 5-10 / WBC >50   Sq Epi: x / Non Sq Epi: x / Bacteria: MOD OBGYN Consult   82y  postmenopausal female (menopause at approx age 53) presented to ED with generalized weakness, emesis, dizziness. She had a pessary placed approx 1 week ago by Dr. Preciado. On Friday she noticed some light spotting from the vagina which became heavier on . She was advised to remove the pessary, which she did herself on . She did not have any bleeding on Monday but noticed spotting again this morning. She called the Urogyn office with complaints of light spotting and weakness/dizziness and was sent to the ED where she was found to be febrile and tachycardic with positive UA. Pt denies any dysuria or change in urinary symptoms. Denies urinary frequency or lower pelvic pain. Denies CP/SOB. Denies NV. Denies fevers/chills.    Upon arrival to ED pt found to be febrile and tachycardiac with positive U/A- patient being treated for urosepsis.    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  S/P cataract extraction: bilateral,   S/P hernia repair: left groin,     Allergies    adhesives (Rash)  Augmentin (Rash)  clavulanate (Rash)  Levaquin (Rash)      Home medications: atorvastatin, losartan, diltiazem, coumadin, tylenol, flovent    FAMILY HISTORY:  Family history of abdominal aortic aneurysm  Family history of stroke  Family history of hypertension: mother       Vital Signs Last 24 Hrs  T(C): 38.9 (19 Dec 2017 10:02), Max: 38.9 (19 Dec 2017 10:02)  T(F): 102 (19 Dec 2017 10:02), Max: 102 (19 Dec 2017 10:02)  HR: 102 (19 Dec 2017 10:02) (102 - 116)  BP: 133/70 (19 Dec 2017 10:02) (132/77 - 133/70)  RR: 13 (19 Dec 2017 10:02) (13 - 16)  SpO2: 100% (19 Dec 2017 10:02) (97% - 100%)    PHYSICAL EXAM:      Constitutional: alert and oriented x 3    Respiratory: clear    Cardiovascular: regular rate and rhythm    Gastrointestinal: soft, non tender, non distended    Genitourinary: urethral caruncle, scant vaginal bleeding, excoriation of posterior vaginal wall noted, uterus nontender, adnexa nontender and non palpable bilaterally, no vaginal bleeding, no fistula noted        LABS:                        12.5   9.29  )-----------( 322      ( 19 Dec 2017 10:25 )             39.2         136  |  96<L>  |  12  ----------------------------<  107<H>  4.1   |  23  |  0.80    Ca    8.9      19 Dec 2017 10:25    TPro  7.6  /  Alb  4.0  /  TBili  0.4  /  DBili  x   /  AST  26  /  ALT  18  /  AlkPhos  85      PT/INR - ( 19 Dec 2017 10:25 )   PT: 59.6 SEC;   INR: 5.01          PTT - ( 19 Dec 2017 10:25 )  PTT:49.4 SEC  Urinalysis Basic - ( 19 Dec 2017 09:53 )    Color: YELLOW / Appearance: HAZY / S.014 / pH: 6.5  Gluc: NEGATIVE / Ketone: NEGATIVE  / Bili: NEGATIVE / Urobili: NORMAL mg/dL   Blood: SMALL / Protein: 20 mg/dL / Nitrite: POSITIVE   Leuk Esterase: LARGE / RBC: 5-10 / WBC >50   Sq Epi: x / Non Sq Epi: x / Bacteria: MOD

## 2017-12-19 NOTE — CONSULT NOTE ADULT - SUBJECTIVE AND OBJECTIVE BOX
HPI:    81 yo F PMH A-Fib on Coumadin, Recent Diagnosis of Pulmonary Fibrosis, ?RA, who presented to the ED on 17 with generalized weakness, emesis and dizziness of ~3 day duration. Of note, ~ 1 week ago the patient had a pessary placed. She noted intermittent spotting from her vagina over the past week which has become more progressive. She notes chills but did not take her temperature at home. She called the Urogyn office with her complaints and was advised to present to the ED. She notes N/V but no diarrhea. She denies cough or shortness of breath. She denies any dysuria, urinary frequency or hesitancy.     In the ED the patient was febrile to Tmax of 102, tachycardic > 110 but normotensive. WBC 9.29 on presentation. Lactic acid of 2.1. U/A with > 50 WBC. 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 Worked at [employer] at the [email or web address] department.     FAMILY HISTORY:  Family history of abdominal aortic aneurysm  Family history of stroke  Family history of hypertension (Mother): mother      REVIEW OF SYSTEMS  [  ] ROS unobtainable because:    [x  ] All other systems negative except as noted below:	    Constitutional:  [ ] fever [ ] weight loss +chills  Skin:  [ ] rash [ ] phlebitis	  Eyes: [ ] icterus [ ] inflammation	  ENMT: [ ] discharge [ ] thrush [ ] ulcers [ ] exudates  Respiratory: [ ] dyspnea [ ] hemoptysis [ ] cough [ ] sputum	  Cardiovascular:  [ ] chest pain [ ] palpitations [ ] edema	  Gastrointestinal:  [x ] nausea [x ] vomiting [ ] diarrhea [ ] constipation [ ] pain	  Genitourinary:  [ ] dysuria [ ] frequency [ ] hematuria [ ] discharge [ ] flank pain +vaginal spotting  Musculoskeletal:  [ ] myalgias [ ] arthralgias [ ] arthritis	  Neurological:  [ ] headache [ ] seizures	  Psychiatric:  [ ] anxiety [ ] depression	  Hematology/Lymphatics:  [ ] lymphadenopathy  Endocrine:  [ ] adrenal [ ] thyroid  Allergic/Immunologic:	 [ ] transplant [ ] seasonal    Vital Signs Last 24 Hrs  T(F): 101.5 (17 @ 15:27), Max: 102 (17 @ 10:02)    Vital Signs Last 24 Hrs  HR: 113 (17 @ 15:27) (102 - 116)  BP: 138/77 (17 @ 15:27) (132/77 - 138/77)  RR: 14 (17 @ 15:27)  SpO2: 100% (17 @ 15:27) (97% - 100%)  Wt(kg): --    PHYSICAL EXAMINATION:  General: Alert and Awake, NAD  HEENT: PERRL, EOMI  Cardiac: RRR, No M/R/G  Resp: CTAB, No Wh/Rh/Ra  Abdomen: NBS, NT/ND, No HSM, No rigidity or guarding  MSK: No LE edema. No calf tenderness  : No [place]  Skin: No rashes or lesions. Skin is warm and dry to the touch.   Neuro: Alert and Awake. CN 2-12 Grossly intact. Moves all four extremities spontaneously.  Psych: Calm, Pleasant, Cooperative                        12.5   9.29  )-----------( 322      ( 19 Dec 2017 10:25 )             39.2           136  |  96<L>  |  12  ----------------------------<  107<H>  4.1   |  23  |  0.80    Ca    8.9      19 Dec 2017 10:25    TPro  7.6  /  Alb  4.0  /  TBili  0.4  /  DBili  x   /  AST  26  /  ALT  18  /  AlkPhos  85        Urinalysis Basic - ( 19 Dec 2017 09:53 )    Color: YELLOW / Appearance: HAZY / S.014 / pH: 6.5  Gluc: NEGATIVE / Ketone: NEGATIVE  / Bili: NEGATIVE / Urobili: NORMAL mg/dL   Blood: SMALL / Protein: 20 mg/dL / Nitrite: POSITIVE   Leuk Esterase: LARGE / RBC: 5-10 / WBC >50   Sq Epi: x / Non Sq Epi: x / Bacteria: MOD    MICROBIOLOGY:    None for review at time of consult    RADIOLOGY:    PROCEDURE DATE:  Dec 19 2017   No acute pulmonary disease. HPI:    81 yo F PMH A-Fib on Coumadin, Recent Diagnosis of Pulmonary Fibrosis, ?RA, who presented to the ED on 17 with generalized weakness, emesis and dizziness of ~3 day duration. Of note, ~ 1 week ago the patient had a pessary placed. She noted intermittent spotting from her vagina over the past week which has become more progressive. She notes chills but did not take her temperature at home. She called the Urogyn office with her complaints and was advised to present to the ED. She notes N/V but no diarrhea. She denies cough or shortness of breath. She denies any dysuria, urinary frequency or hesitancy.     In the ED the patient was febrile to Tmax of 102, tachycardic > 110 but normotensive. WBC 9.29 on presentation. Lactic acid of 2.1. U/A with > 50 WBC. 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 Worked at [employer] at the [employer] department.     FAMILY HISTORY:  Family history of abdominal aortic aneurysm  Family history of stroke  Family history of hypertension (Mother): mother      REVIEW OF SYSTEMS  [  ] ROS unobtainable because:    [x  ] All other systems negative except as noted below:	    Constitutional:  [ ] fever [ ] weight loss +chills  Skin:  [ ] rash [ ] phlebitis	  Eyes: [ ] icterus [ ] inflammation	  ENMT: [ ] discharge [ ] thrush [ ] ulcers [ ] exudates  Respiratory: [ ] dyspnea [ ] hemoptysis [ ] cough [ ] sputum	  Cardiovascular:  [ ] chest pain [ ] palpitations [ ] edema	  Gastrointestinal:  [x ] nausea [x ] vomiting [ ] diarrhea [ ] constipation [ ] pain	  Genitourinary:  [ ] dysuria [ ] frequency [ ] hematuria [ ] discharge [ ] flank pain +vaginal spotting  Musculoskeletal:  [ ] myalgias [ ] arthralgias [ ] arthritis	  Neurological:  [ ] headache [ ] seizures	  Psychiatric:  [ ] anxiety [ ] depression	  Hematology/Lymphatics:  [ ] lymphadenopathy  Endocrine:  [ ] adrenal [ ] thyroid  Allergic/Immunologic:	 [ ] transplant [ ] seasonal    Vital Signs Last 24 Hrs  T(F): 101.5 (17 @ 15:27), Max: 102 (17 @ 10:02)    Vital Signs Last 24 Hrs  HR: 113 (17 @ 15:27) (102 - 116)  BP: 138/77 (17 @ 15:27) (132/77 - 138/77)  RR: 14 (17 @ 15:27)  SpO2: 100% (17 @ 15:27) (97% - 100%)  Wt(kg): --    PHYSICAL EXAMINATION:  General: Alert and Awake, NAD  HEENT: PERRL, EOMI  Cardiac: RRR, No M/R/G  PPM left  Resp: CTAB, No Wh/Rh/Ra  Abdomen: NBS, NT/ND, No HSM, No rigidity or guarding  MSK: No LE edema. No calf tenderness  : No [place]  Skin: No rashes or lesions. Skin is warm and dry to the touch.   Neuro: Alert and Awake. CN 2-12 Grossly intact. Moves all four extremities spontaneously.  Psych: Calm, Pleasant, Cooperative                        12.5   9.29  )-----------( 322      ( 19 Dec 2017 10:25 )             39.2           136  |  96<L>  |  12  ----------------------------<  107<H>  4.1   |  23  |  0.80    Ca    8.9      19 Dec 2017 10:25    TPro  7.6  /  Alb  4.0  /  TBili  0.4  /  DBili  x   /  AST  26  /  ALT  18  /  AlkPhos  85        Urinalysis Basic - ( 19 Dec 2017 09:53 )    Color: YELLOW / Appearance: HAZY / S.014 / pH: 6.5  Gluc: NEGATIVE / Ketone: NEGATIVE  / Bili: NEGATIVE / Urobili: NORMAL mg/dL   Blood: SMALL / Protein: 20 mg/dL / Nitrite: POSITIVE   Leuk Esterase: LARGE / RBC: 5-10 / WBC >50   Sq Epi: x / Non Sq Epi: x / Bacteria: MOD    MICROBIOLOGY:    None for review at time of consult    RADIOLOGY:    PROCEDURE DATE:  Dec 19 2017   No acute pulmonary disease.

## 2017-12-19 NOTE — H&P ADULT - ASSESSMENT
82F h/o HTN, afib on coubmadin, recent dx of pulm fibrosis presented to ED with generalized weakness, emesis, dizziness x3day, admitted for sepsis 2/2 UTI:

## 2017-12-19 NOTE — CONSULT NOTE ADULT - ATTENDING COMMENTS
Agree with above.   -f/u BCx  -Check PPM interrogation    Chemo Fung MD  [facility]  [address], [address]  [place] [address]  office: [phone]  pager: [phone]
UTI in 83 yo woman with fever, chills and pyuria.  s/p pessary placement 1 week ago.  She developed vaginal bleeding several days ago and removed pessary.  Blood and urine cultures are testing.    h/o PCN and levaquin allergy.  would continue aztreonam 1 gm iv q 8 hours.

## 2017-12-19 NOTE — H&P ADULT - NSHPPHYSICALEXAM_GEN_ALL_CORE
Vital Signs Last 24 Hrs  T(C): 38.9 (12-19-17 @ 10:02), Max: 38.9 (12-19-17 @ 10:02)  T(F): 102 (12-19-17 @ 10:02), Max: 102 (12-19-17 @ 10:02)  HR: 102 (12-19-17 @ 10:02) (102 - 116)  BP: 133/70 (12-19-17 @ 10:02) (132/77 - 133/70)  BP(mean): --  RR: 13 (12-19-17 @ 10:02) (13 - 16)  SpO2: 100% (12-19-17 @ 10:02) (97% - 100%)    GENERAL: NAD  HEENT: EOMI, MMM, no oropharyngeal lesions or erythema appreciated  Pulm: normal work of breathing, CTABL  CV: RRR, S1&S2+, no m/r/g appreciated  ABDOMEN: soft, nt, nd, no hepatosplenomegaly  MSK: nl ROM  EXTREMITIES:  no appreciable edema in b/l LE  Neuro: A&Ox3, no focal deficits  SKIN: warm and dry, no visible rash Vital Signs Last 24 Hrs  T(C): 38.9 (12-19-17 @ 10:02), Max: 38.9 (12-19-17 @ 10:02)  T(F): 102 (12-19-17 @ 10:02), Max: 102 (12-19-17 @ 10:02)  HR: 102 (12-19-17 @ 10:02) (102 - 116)  BP: 133/70 (12-19-17 @ 10:02) (132/77 - 133/70)  BP(mean): --  RR: 13 (12-19-17 @ 10:02) (13 - 16)  SpO2: 100% (12-19-17 @ 10:02) (97% - 100%)    GENERAL: NAD  HEENT: EOMI, MMM, no oropharyngeal lesions or erythema appreciated  Pulm: normal work of breathing, CTABL  CV: tachy  S1&S2+, no m/r/g appreciated  ABDOMEN: soft, nt, nd,   MSK: nl ROM  EXTREMITIES:  no appreciable edema in b/l LE  Neuro: A&Ox3, no focal deficits  SKIN: warm and dry, no visible rash

## 2017-12-19 NOTE — CONSULT NOTE ADULT - ASSESSMENT
81 yo F PMH A-Fib on Coumadin, Recent Diagnosis of Pulmonary Fibrosis, ?RA, who presented to the ED on 12/19/17 with generalized weakness, emesis and dizziness of ~3 day duration. Of note, ~ 1 week ago the patient had a pessary placed. In the ED the patient was febrile to Tmax of 102, tachycardic > 110 but normotensive. WBC 9.29 on presentation. U/A with > 50 WBC. CXR clear. Would continue to treat for UTI/Pyelonephritis with Aztreonam. Would recommend discontinuing Vancomycin.  --Continue Aztreonam 2g IV Q8H  --Stop Vancomycin  --F/U Urine Cultures and Blood Cultures 81 yo F PMH A-Fib on Coumadin, Recent Diagnosis of Pulmonary Fibrosis, ?RA, who presented to the ED on 12/19/17 with generalized weakness, emesis and dizziness of ~3 day duration. Of note, ~ 1 week ago the patient had a pessary placed. In the ED the patient was febrile to Tmax of 102, tachycardic > 110 but normotensive. WBC 9.29 on presentation. U/A with > 50 WBC. CXR clear. Would continue to treat for UTI/Pyelonephritis with Aztreonam. Would recommend discontinuing Vancomycin.  --Continue Aztreonam 1 g IV Q8H  --Stop Vancomycin  --F/U Urine Cultures and Blood Cultures 81 yo F PMH A-Fib on Coumadin, Recent Diagnosis of Pulmonary Fibrosis, ?RA, who presented to the ED on 12/19/17 with generalized weakness, emesis and dizziness of ~3 day duration. Of note, ~ 1 week ago the patient had a pessary placed. In the ED the patient was febrile to Tmax of 102, tachycardic > 110 but normotensive. WBC 9.29 on presentation. U/A with > 50 WBC. CXR clear. Would continue to treat for UTI/Pyelonephritis with Aztreonam. Would recommend discontinuing Vancomycin.  --Continue Aztreonam 1 g IV Q8H  --Stop Vancomycin (Order Placed)  --F/U Urine Cultures and Blood Cultures

## 2017-12-19 NOTE — PATIENT PROFILE ADULT. - FALL HARM RISK
other/age(85 years old or older)/high BP,dizziness/bones(Osteoporosis,prev fx,steroid use,metastatic bone ca

## 2017-12-19 NOTE — ED PROVIDER NOTE - OBJECTIVE STATEMENT
82F pmh aFib on coumadin, HTN, pulmonary fibrosis, undifferentiated vaginal bleed, coming in for light-headedness x 3 nights associated with vomiting. Pt says she came in this morning because it has been especially debilitating since last night. Per pt she vomited a small volume NBNB 3-4 times last night. Pt said she has had similar light-headedness episodes in the past but that it has "been a while" since last one (was worked up at that time by neurologist but never received official diagnosis). Since Friday pt endorses new onset vaginal bleed (2-3 pads/24 hours) that began after she had a pessary placed. Since then every time she urinates she notices blood in the toilet. Pt denies taking any falls/hitting her head, cp, sob, abdominal pain, diarrhea, subjective fever/chills.

## 2017-12-19 NOTE — H&P ADULT - FAMILY HISTORY
Family history of stroke     Family history of abdominal aortic aneurysm     Mother  Still living? Unknown  Family history of hypertension, Age at diagnosis: Age Unknown

## 2017-12-19 NOTE — H&P ADULT - HISTORY OF PRESENT ILLNESS
82y  postmenopausal female (menopause at approx age 53) presented to ED with generalized weakness, emesis, dizziness. She had a pessary placed approx 1 week ago by Dr. Preciado. On Friday she noticed some light spotting from the vagina which became heavier on . She was advised to remove the pessary, which she did herself on . She did not have any bleeding on Monday but noticed spotting again this morning. She called the Urogyn office with complaints of light spotting and weakness/dizziness and was sent to the ED where she was found to be febrile and tachycardic with positive UA. Pt denies any dysuria or change in urinary symptoms. Denies urinary frequency or lower pelvic pain. Denies CP/SOB. Denies NV. Denies fevers/chills.    Upon arrival to ED pt found to be febrile and tachycardiac with positive U/A. 82F h/o HTN, afib on coubmadin, recent dx of pulm fibrosis presented to ED with generalized weakness, emesis, dizziness x3day. She had a pessary placed approx 1 week ago by Dr. Preciado. 3 days PTA she noticed some light spotting from the vagina which became progessively heavier. She was advised to remove the pessary, which she did herself on Sunday. She did not have any bleeding on Monday but noticed spotting again this morning. She called the Urogyn office with complaints of light spotting and weakness/dizziness and was sent to the ED where she was found to be febrile and tachycardic with positive UA. Pt denies any dysuria or change in urinary symptoms. Denies urinary frequency or lower pelvic pain. Denies CP/SOB, abn pain, diarrhea, constipation, HA, cough, sore throat or myalgias. No sick contacts or recent travel. Lives in [place] with her . Never smoker.     Upon arrival to ED pt found to be febrile and tachycardiac, normotensive sating well on RA. UA positive with INR of 5. Given vanc, aztreonam, 2L NS, IV tylenol and zofran.

## 2017-12-20 LAB
ALBUMIN SERPL ELPH-MCNC: 2.8 G/DL — LOW (ref 3.3–5)
ALP SERPL-CCNC: 61 U/L — SIGNIFICANT CHANGE UP (ref 40–120)
ALT FLD-CCNC: 24 U/L — SIGNIFICANT CHANGE UP (ref 4–33)
APTT BLD: 48.5 SEC — HIGH (ref 27.5–37.4)
AST SERPL-CCNC: 35 U/L — HIGH (ref 4–32)
BASE EXCESS BLDV CALC-SCNC: -0.3 MMOL/L — SIGNIFICANT CHANGE UP
BASOPHILS # BLD AUTO: 0.02 K/UL — SIGNIFICANT CHANGE UP (ref 0–0.2)
BASOPHILS NFR BLD AUTO: 0.3 % — SIGNIFICANT CHANGE UP (ref 0–2)
BILIRUB SERPL-MCNC: 0.4 MG/DL — SIGNIFICANT CHANGE UP (ref 0.2–1.2)
BLOOD GAS VENOUS - CREATININE: 0.69 MG/DL — SIGNIFICANT CHANGE UP (ref 0.5–1.3)
BUN SERPL-MCNC: 13 MG/DL — SIGNIFICANT CHANGE UP (ref 7–23)
CALCIUM SERPL-MCNC: 7.5 MG/DL — LOW (ref 8.4–10.5)
CHLORIDE BLDV-SCNC: 110 MMOL/L — HIGH (ref 96–108)
CHLORIDE SERPL-SCNC: 104 MMOL/L — SIGNIFICANT CHANGE UP (ref 98–107)
CO2 SERPL-SCNC: 23 MMOL/L — SIGNIFICANT CHANGE UP (ref 22–31)
CREAT SERPL-MCNC: 0.64 MG/DL — SIGNIFICANT CHANGE UP (ref 0.5–1.3)
EOSINOPHIL # BLD AUTO: 0 K/UL — SIGNIFICANT CHANGE UP (ref 0–0.5)
EOSINOPHIL NFR BLD AUTO: 0 % — SIGNIFICANT CHANGE UP (ref 0–6)
GAS PNL BLDV: 132 MMOL/L — LOW (ref 136–146)
GLUCOSE BLDV-MCNC: 108 — HIGH (ref 70–99)
GLUCOSE SERPL-MCNC: 103 MG/DL — HIGH (ref 70–99)
HCO3 BLDV-SCNC: 24 MMOL/L — SIGNIFICANT CHANGE UP (ref 20–27)
HCT VFR BLD CALC: 30.8 % — LOW (ref 34.5–45)
HCT VFR BLD CALC: 30.8 % — LOW (ref 34.5–45)
HCT VFR BLD CALC: 32.4 % — LOW (ref 34.5–45)
HCT VFR BLDV CALC: 31.9 % — LOW (ref 34.5–45)
HGB BLD-MCNC: 10.4 G/DL — LOW (ref 11.5–15.5)
HGB BLD-MCNC: 9.9 G/DL — LOW (ref 11.5–15.5)
HGB BLD-MCNC: 9.9 G/DL — LOW (ref 11.5–15.5)
HGB BLDV-MCNC: 10.3 G/DL — LOW (ref 11.5–15.5)
IMM GRANULOCYTES # BLD AUTO: 0.03 # — SIGNIFICANT CHANGE UP
IMM GRANULOCYTES NFR BLD AUTO: 0.5 % — SIGNIFICANT CHANGE UP (ref 0–1.5)
INR BLD: 6 — CRITICAL HIGH (ref 0.88–1.17)
LACTATE BLDV-MCNC: 1 MMOL/L — SIGNIFICANT CHANGE UP (ref 0.5–2)
LYMPHOCYTES # BLD AUTO: 0.35 K/UL — LOW (ref 1–3.3)
LYMPHOCYTES # BLD AUTO: 5.4 % — LOW (ref 13–44)
MAGNESIUM SERPL-MCNC: 1.3 MG/DL — LOW (ref 1.6–2.6)
MCHC RBC-ENTMCNC: 28.5 PG — SIGNIFICANT CHANGE UP (ref 27–34)
MCHC RBC-ENTMCNC: 28.8 PG — SIGNIFICANT CHANGE UP (ref 27–34)
MCHC RBC-ENTMCNC: 29 PG — SIGNIFICANT CHANGE UP (ref 27–34)
MCHC RBC-ENTMCNC: 32.1 % — SIGNIFICANT CHANGE UP (ref 32–36)
MCV RBC AUTO: 88.8 FL — SIGNIFICANT CHANGE UP (ref 80–100)
MCV RBC AUTO: 89.5 FL — SIGNIFICANT CHANGE UP (ref 80–100)
MCV RBC AUTO: 90.3 FL — SIGNIFICANT CHANGE UP (ref 80–100)
METHOD TYPE: SIGNIFICANT CHANGE UP
MONOCYTES # BLD AUTO: 0.41 K/UL — SIGNIFICANT CHANGE UP (ref 0–0.9)
MONOCYTES NFR BLD AUTO: 6.3 % — SIGNIFICANT CHANGE UP (ref 2–14)
NEUTROPHILS # BLD AUTO: 5.73 K/UL — SIGNIFICANT CHANGE UP (ref 1.8–7.4)
NEUTROPHILS NFR BLD AUTO: 87.5 % — HIGH (ref 43–77)
NRBC # FLD: 0 — SIGNIFICANT CHANGE UP
OB PNL STL: NEGATIVE — SIGNIFICANT CHANGE UP
ORGANISM # SPEC MICROSCOPIC CNT: SIGNIFICANT CHANGE UP
ORGANISM # SPEC MICROSCOPIC CNT: SIGNIFICANT CHANGE UP
PCO2 BLDV: 35 MMHG — LOW (ref 41–51)
PH BLDV: 7.44 PH — HIGH (ref 7.32–7.43)
PHOSPHATE SERPL-MCNC: 2.4 MG/DL — LOW (ref 2.5–4.5)
PLATELET # BLD AUTO: 189 K/UL — SIGNIFICANT CHANGE UP (ref 150–400)
PLATELET # BLD AUTO: 191 K/UL — SIGNIFICANT CHANGE UP (ref 150–400)
PLATELET # BLD AUTO: 206 K/UL — SIGNIFICANT CHANGE UP (ref 150–400)
PMV BLD: 9.3 FL — SIGNIFICANT CHANGE UP (ref 7–13)
PMV BLD: 9.4 FL — SIGNIFICANT CHANGE UP (ref 7–13)
PMV BLD: 9.6 FL — SIGNIFICANT CHANGE UP (ref 7–13)
PO2 BLDV: 80 MMHG — HIGH (ref 35–40)
POTASSIUM BLDV-SCNC: 3.4 MMOL/L — SIGNIFICANT CHANGE UP (ref 3.4–4.5)
POTASSIUM SERPL-MCNC: 3.7 MMOL/L — SIGNIFICANT CHANGE UP (ref 3.5–5.3)
POTASSIUM SERPL-SCNC: 3.7 MMOL/L — SIGNIFICANT CHANGE UP (ref 3.5–5.3)
PROT SERPL-MCNC: 5.6 G/DL — LOW (ref 6–8.3)
PROTHROM AB SERPL-ACNC: 69.1 SEC — HIGH (ref 9.8–13.1)
RBC # BLD: 3.41 M/UL — LOW (ref 3.8–5.2)
RBC # BLD: 3.44 M/UL — LOW (ref 3.8–5.2)
RBC # BLD: 3.65 M/UL — LOW (ref 3.8–5.2)
RBC # FLD: 13.7 % — SIGNIFICANT CHANGE UP (ref 10.3–14.5)
RBC # FLD: 13.7 % — SIGNIFICANT CHANGE UP (ref 10.3–14.5)
RBC # FLD: 13.8 % — SIGNIFICANT CHANGE UP (ref 10.3–14.5)
SAO2 % BLDV: 96.4 % — HIGH (ref 60–85)
SODIUM SERPL-SCNC: 137 MMOL/L — SIGNIFICANT CHANGE UP (ref 135–145)
SPECIMEN SOURCE: SIGNIFICANT CHANGE UP
WBC # BLD: 4.64 K/UL — SIGNIFICANT CHANGE UP (ref 3.8–10.5)
WBC # BLD: 4.69 K/UL — SIGNIFICANT CHANGE UP (ref 3.8–10.5)
WBC # BLD: 6.54 K/UL — SIGNIFICANT CHANGE UP (ref 3.8–10.5)
WBC # FLD AUTO: 4.64 K/UL — SIGNIFICANT CHANGE UP (ref 3.8–10.5)
WBC # FLD AUTO: 4.69 K/UL — SIGNIFICANT CHANGE UP (ref 3.8–10.5)
WBC # FLD AUTO: 6.54 K/UL — SIGNIFICANT CHANGE UP (ref 3.8–10.5)

## 2017-12-20 PROCEDURE — 99232 SBSQ HOSP IP/OBS MODERATE 35: CPT

## 2017-12-20 RX ORDER — MAGNESIUM SULFATE 500 MG/ML
1 VIAL (ML) INJECTION ONCE
Qty: 0 | Refills: 0 | Status: COMPLETED | OUTPATIENT
Start: 2017-12-20 | End: 2017-12-20

## 2017-12-20 RX ORDER — SODIUM CHLORIDE 9 MG/ML
500 INJECTION INTRAMUSCULAR; INTRAVENOUS; SUBCUTANEOUS ONCE
Qty: 0 | Refills: 0 | Status: COMPLETED | OUTPATIENT
Start: 2017-12-20 | End: 2017-12-20

## 2017-12-20 RX ORDER — AZTREONAM 2 G
2000 VIAL (EA) INJECTION EVERY 8 HOURS
Qty: 0 | Refills: 0 | Status: DISCONTINUED | OUTPATIENT
Start: 2017-12-20 | End: 2017-12-22

## 2017-12-20 RX ORDER — ACETAMINOPHEN 500 MG
650 TABLET ORAL EVERY 6 HOURS
Qty: 0 | Refills: 0 | Status: DISCONTINUED | OUTPATIENT
Start: 2017-12-20 | End: 2017-12-24

## 2017-12-20 RX ORDER — ACETAMINOPHEN 500 MG
650 TABLET ORAL ONCE
Qty: 0 | Refills: 0 | Status: COMPLETED | OUTPATIENT
Start: 2017-12-20 | End: 2017-12-20

## 2017-12-20 RX ADMIN — Medication 650 MILLIGRAM(S): at 13:07

## 2017-12-20 RX ADMIN — Medication 100 MILLIGRAM(S): at 12:48

## 2017-12-20 RX ADMIN — PANTOPRAZOLE SODIUM 40 MILLIGRAM(S): 20 TABLET, DELAYED RELEASE ORAL at 06:05

## 2017-12-20 RX ADMIN — SODIUM CHLORIDE 75 MILLILITER(S): 9 INJECTION INTRAMUSCULAR; INTRAVENOUS; SUBCUTANEOUS at 06:05

## 2017-12-20 RX ADMIN — Medication 650 MILLIGRAM(S): at 03:58

## 2017-12-20 RX ADMIN — SODIUM CHLORIDE 1000 MILLILITER(S): 9 INJECTION INTRAMUSCULAR; INTRAVENOUS; SUBCUTANEOUS at 06:15

## 2017-12-20 RX ADMIN — Medication 1 PUFF(S): at 22:45

## 2017-12-20 RX ADMIN — ATORVASTATIN CALCIUM 10 MILLIGRAM(S): 80 TABLET, FILM COATED ORAL at 22:45

## 2017-12-20 RX ADMIN — SODIUM CHLORIDE 75 MILLILITER(S): 9 INJECTION INTRAMUSCULAR; INTRAVENOUS; SUBCUTANEOUS at 10:00

## 2017-12-20 RX ADMIN — Medication 650 MILLIGRAM(S): at 14:07

## 2017-12-20 RX ADMIN — Medication 100 GRAM(S): at 10:00

## 2017-12-20 RX ADMIN — Medication 100 MILLIGRAM(S): at 22:45

## 2017-12-20 RX ADMIN — Medication 650 MILLIGRAM(S): at 03:09

## 2017-12-20 RX ADMIN — Medication 1 PUFF(S): at 08:05

## 2017-12-20 RX ADMIN — Medication 50 MILLIGRAM(S): at 06:06

## 2017-12-20 NOTE — PHYSICAL THERAPY INITIAL EVALUATION ADULT - PLANNED THERAPY INTERVENTIONS, PT EVAL
balance training/bed mobility training/stair training PRN/gait training/transfer training/strengthening

## 2017-12-20 NOTE — PHYSICAL THERAPY INITIAL EVALUATION ADULT - DISCHARGE DISPOSITION, PT EVAL
TBD post-out of bed assessment; PT to attempt to see pt. again tomorrow, if time permits, for out of bed assessment

## 2017-12-20 NOTE — PHYSICAL THERAPY INITIAL EVALUATION ADULT - PERTINENT HX OF CURRENT PROBLEM, REHAB EVAL
Pt. is an 83 y/o female admitted to [facility] on 12/19/17 with a dx of UTI, vaginal bleeding/spotting, elevated INR (pt. on Coumadin), and dysuria.  PT consult request due to deconditioning secondary to admission with anticipated discharge for 12/22/17.   H/O A-Fib.  INR = 6.00 today.  Cardiac Enzymes (-) x 1.   (-) CXR.

## 2017-12-20 NOTE — PHYSICAL THERAPY INITIAL EVALUATION ADULT - ADDITIONAL COMMENTS
Pt. left in bed post-PT in NAD with all lines/tubes intact & call bell within reach.  RN Maggie rolon.

## 2017-12-20 NOTE — PHYSICAL THERAPY INITIAL EVALUATION ADULT - PATIENT PROFILE REVIEW, REHAB EVAL
ACTIVITY: ambulate with assistance; consult with RN Maggie Finney --> will HOLD out of bed today secondary to INR = 6.00 this AM, but OK to dangle at edge of bed/yes

## 2017-12-20 NOTE — PHYSICAL THERAPY INITIAL EVALUATION ADULT - DIAGNOSIS, PT EVAL
UTI, vaginal bleeding/spotting, elevated INR (pt. on Coumadin), and dysuria; deconditioning secondary to admission

## 2017-12-20 NOTE — PROGRESS NOTE ADULT - ASSESSMENT
E coli bacteremia / urosepsis in 81 yo woman with back pain and vaginal bleeding 1 week after pessary (now removed).  Fever downtrending and no longer hypotensive.  PPM  h/o PCN and levaquin allergy.     suggest : increase aztreonam 2 gm iv q 8 hours                follow blood and urine cultures                consider image abd/pelvis

## 2017-12-20 NOTE — CHART NOTE - NSCHARTNOTEFT_GEN_A_CORE
Pt seen at bedside this AM. Post void residual performed with bladder scan. PVR of 11cc. Pt tolerated exam. Pt reports that she feels with this am. Recommend continuation of ID and medicine recommendations.    D/w Dr. Gege MONROYreling PGY3 Pt seen at bedside this AM. Post void residual performed with bladder scan. PVR of 11cc. Pt tolerated exam. Pt reports that she feels with this am. Recommend continuation of ID and medicine recommendations.    D/w Dr. Gege MONROYreling PGY3    Fellow note: adequate bladder emptying by PVR, no acute GYN intervention at this time. Recommend care as per ID/Medicine/Cardiology. ADRY De Guzman

## 2017-12-21 ENCOUNTER — TRANSCRIPTION ENCOUNTER (OUTPATIENT)
Age: 82
End: 2017-12-21

## 2017-12-21 LAB
-  AMIKACIN: SIGNIFICANT CHANGE UP
-  AMPICILLIN/SULBACTAM: SIGNIFICANT CHANGE UP
-  AMPICILLIN: SIGNIFICANT CHANGE UP
-  AZTREONAM: SIGNIFICANT CHANGE UP
-  CEFAZOLIN: SIGNIFICANT CHANGE UP
-  CEFEPIME: SIGNIFICANT CHANGE UP
-  CEFOXITIN: SIGNIFICANT CHANGE UP
-  CEFTAZIDIME: SIGNIFICANT CHANGE UP
-  CEFTRIAXONE: SIGNIFICANT CHANGE UP
-  CIPROFLOXACIN: SIGNIFICANT CHANGE UP
-  ERTAPENEM: SIGNIFICANT CHANGE UP
-  GENTAMICIN: SIGNIFICANT CHANGE UP
-  IMIPENEM: SIGNIFICANT CHANGE UP
-  LEVOFLOXACIN: SIGNIFICANT CHANGE UP
-  MEROPENEM: SIGNIFICANT CHANGE UP
-  NITROFURANTOIN: SIGNIFICANT CHANGE UP
-  PIPERACILLIN/TAZOBACTAM: SIGNIFICANT CHANGE UP
-  TIGECYCLINE: SIGNIFICANT CHANGE UP
-  TOBRAMYCIN: SIGNIFICANT CHANGE UP
-  TRIMETHOPRIM/SULFAMETHOXAZOLE: SIGNIFICANT CHANGE UP
APTT BLD: 39.3 SEC — HIGH (ref 27.5–37.4)
BACTERIA UR CULT: SIGNIFICANT CHANGE UP
BUN SERPL-MCNC: 11 MG/DL — SIGNIFICANT CHANGE UP (ref 7–23)
CALCIUM SERPL-MCNC: 7.5 MG/DL — LOW (ref 8.4–10.5)
CHLORIDE SERPL-SCNC: 102 MMOL/L — SIGNIFICANT CHANGE UP (ref 98–107)
CO2 SERPL-SCNC: 24 MMOL/L — SIGNIFICANT CHANGE UP (ref 22–31)
CREAT SERPL-MCNC: 0.6 MG/DL — SIGNIFICANT CHANGE UP (ref 0.5–1.3)
GLUCOSE SERPL-MCNC: 106 MG/DL — HIGH (ref 70–99)
HCT VFR BLD CALC: 29.5 % — LOW (ref 34.5–45)
HGB BLD-MCNC: 9.4 G/DL — LOW (ref 11.5–15.5)
INR BLD: 3.21 — HIGH (ref 0.88–1.17)
MAGNESIUM SERPL-MCNC: 1.7 MG/DL — SIGNIFICANT CHANGE UP (ref 1.6–2.6)
MCHC RBC-ENTMCNC: 28.8 PG — SIGNIFICANT CHANGE UP (ref 27–34)
MCHC RBC-ENTMCNC: 31.9 % — LOW (ref 32–36)
MCV RBC AUTO: 90.5 FL — SIGNIFICANT CHANGE UP (ref 80–100)
METHOD TYPE: SIGNIFICANT CHANGE UP
NRBC # FLD: 0 — SIGNIFICANT CHANGE UP
ORGANISM # SPEC MICROSCOPIC CNT: SIGNIFICANT CHANGE UP
PHOSPHATE SERPL-MCNC: 1.8 MG/DL — LOW (ref 2.5–4.5)
PLATELET # BLD AUTO: 172 K/UL — SIGNIFICANT CHANGE UP (ref 150–400)
PMV BLD: 9.9 FL — SIGNIFICANT CHANGE UP (ref 7–13)
POTASSIUM SERPL-MCNC: 3.7 MMOL/L — SIGNIFICANT CHANGE UP (ref 3.5–5.3)
POTASSIUM SERPL-SCNC: 3.7 MMOL/L — SIGNIFICANT CHANGE UP (ref 3.5–5.3)
PROTHROM AB SERPL-ACNC: 37.8 SEC — HIGH (ref 9.8–13.1)
RBC # BLD: 3.26 M/UL — LOW (ref 3.8–5.2)
RBC # FLD: 13.7 % — SIGNIFICANT CHANGE UP (ref 10.3–14.5)
SODIUM SERPL-SCNC: 136 MMOL/L — SIGNIFICANT CHANGE UP (ref 135–145)
WBC # BLD: 4.23 K/UL — SIGNIFICANT CHANGE UP (ref 3.8–10.5)
WBC # FLD AUTO: 4.23 K/UL — SIGNIFICANT CHANGE UP (ref 3.8–10.5)

## 2017-12-21 PROCEDURE — 74176 CT ABD & PELVIS W/O CONTRAST: CPT | Mod: 26

## 2017-12-21 PROCEDURE — 99232 SBSQ HOSP IP/OBS MODERATE 35: CPT

## 2017-12-21 PROCEDURE — 93280 PM DEVICE PROGR EVAL DUAL: CPT | Mod: 26

## 2017-12-21 RX ORDER — FUROSEMIDE 40 MG
20 TABLET ORAL ONCE
Qty: 0 | Refills: 0 | Status: DISCONTINUED | OUTPATIENT
Start: 2017-12-21 | End: 2017-12-24

## 2017-12-21 RX ORDER — POTASSIUM PHOSPHATE, MONOBASIC POTASSIUM PHOSPHATE, DIBASIC 236; 224 MG/ML; MG/ML
15 INJECTION, SOLUTION INTRAVENOUS ONCE
Qty: 0 | Refills: 0 | Status: COMPLETED | OUTPATIENT
Start: 2017-12-21 | End: 2017-12-21

## 2017-12-21 RX ADMIN — ATORVASTATIN CALCIUM 10 MILLIGRAM(S): 80 TABLET, FILM COATED ORAL at 22:50

## 2017-12-21 RX ADMIN — Medication 100 MILLIGRAM(S): at 05:06

## 2017-12-21 RX ADMIN — Medication 1 PUFF(S): at 22:50

## 2017-12-21 RX ADMIN — POTASSIUM PHOSPHATE, MONOBASIC POTASSIUM PHOSPHATE, DIBASIC 62.5 MILLIMOLE(S): 236; 224 INJECTION, SOLUTION INTRAVENOUS at 17:35

## 2017-12-21 RX ADMIN — PANTOPRAZOLE SODIUM 40 MILLIGRAM(S): 20 TABLET, DELAYED RELEASE ORAL at 05:06

## 2017-12-21 RX ADMIN — Medication 100 MILLIGRAM(S): at 22:50

## 2017-12-21 RX ADMIN — Medication 100 MILLIGRAM(S): at 13:13

## 2017-12-21 NOTE — DISCHARGE NOTE ADULT - MEDICATION SUMMARY - MEDICATIONS TO TAKE
I will START or STAY ON the medications listed below when I get home from the hospital:    dilTIAZem 180 mg/24 hours oral capsule, extended release  -- 1 cap(s) by mouth once a day  -- Indication: For Atrial fibrillation, unspecified type    warfarin 5 mg oral tablet  -- Indication: For Atrial fibrillation, unspecified type    Lipitor 10 mg oral tablet  -- 1 tab(s) by mouth once a day (at bedtime)  -- Indication: For HLD    hydrochlorothiazide-losartan 12.5 mg-50 mg oral tablet  -- 1 tab(s) by mouth once a day  -- Indication: For HTN    cefpodoxime 200 mg oral tablet  -- 1 tab(s) by mouth every 12 hours   -- Finish all this medication unless otherwise directed by prescriber.  Take with food or milk.    -- Indication: For Sepsis due to Escherichia coli    Acidophilus oral capsule  -- 1 cap(s) by mouth once a day   -- Indication: For prophylaxis     omeprazole  -- 20 milligram(s) by mouth once a day  -- Indication: For GERD    fluticasone CFC free 110 mcg/inh inhalation aerosol  -- 1 puff(s) inhaled 2 times a day  -- Indication: For Asthma, unspecified asthma severity, unspecified whether complicated, unspecified whether persistent I will START or STAY ON the medications listed below when I get home from the hospital:    dilTIAZem 180 mg/24 hours oral capsule, extended release  -- 1 cap(s) by mouth once a day  -- Indication: For Atrial fibrillation, unspecified type    warfarin 5 mg oral tablet  -- Indication: For Atrial fibrillation, unspecified type    Lipitor 10 mg oral tablet  -- 1 tab(s) by mouth once a day (at bedtime)  -- Indication: For HLD    hydrochlorothiazide-losartan 12.5 mg-50 mg oral tablet  -- 1 tab(s) by mouth once a day  -- Indication: For HTN    cefpodoxime 200 mg oral tablet  -- 1 tab(s) by mouth every 12 hours   -- Finish all this medication unless otherwise directed by prescriber.  Take with food or milk.    -- Indication: For Sepsis due to Escherichia coli    Acidophilus oral capsule  -- 1 cap(s) by mouth once a day   -- Indication: For Prophylaxis     omeprazole  -- 20 milligram(s) by mouth once a day  -- Indication: For GERD    fluticasone CFC free 110 mcg/inh inhalation aerosol  -- 1 puff(s) inhaled 2 times a day  -- Indication: For nasal congestion

## 2017-12-21 NOTE — DISCHARGE NOTE ADULT - CARE PROVIDER_API CALL
Xenia May), Cardiovascular Disease; Internal Medicine  [address] [facility] [address]  [place] [address]  Phone: [phone]  Fax: [phone]    Linda Preciado), Obstetrics and Gynecology  [address] [place] [address]  [address]  [place] [address]  Phone: [phone]  Fax: [phone]

## 2017-12-21 NOTE — DISCHARGE NOTE ADULT - ADDITIONAL INSTRUCTIONS
Dr Nunes 12/27/17 @ [facility] pm Dr Nunes 12/27/17 @ [facility] pm****

## 2017-12-21 NOTE — DISCHARGE NOTE ADULT - PATIENT PORTAL LINK FT
“You can access the Follow[employer] Patient Portal, offered by [facility], by registering with the following website: [email or web address]”

## 2017-12-21 NOTE — DISCHARGE NOTE ADULT - SECONDARY DIAGNOSIS.
Supratherapeutic INR Urinary tract infection without hematuria, site unspecified Artificial cardiac pacemaker

## 2017-12-21 NOTE — CHART NOTE - NSCHARTNOTEFT_GEN_A_CORE
ELECTROPHYSIOLOGY    Device Interrogation Performed                                  Date/Time:   12/21/2017  :               [employer]                       Model:       Evia DR-T   dual chamber PPM                         Mode:                   DDDR                                    Rate:      60/130     Atrial Lead:  P wave amplitude:           1.4               mv            Impedance                    409                   Ohms  Threshold                    0.8                      V @    0.5            ms      Ventricular Lead: RV  R wave amplitude        8.3                   mv  Impedance                 487                      Ohms  Threshold                     0.6                    V @     0.5            ms                                          Battery Status:                     Good                    Underlying Rhythm:    Normal sinus rhythm at 86 b/min    Events/Observation:   Multiple episodes of atrial fibrillation. Longest 51 min 12/18/2017   Last 12/18/17 2 seconds                                   History of PAF, on Coumadin.     Impression/Plan:          Normal PPM function.       Normal sensing and pacing via iterative testing. Good battery status. Excellent threshold capture.  No reprogramming. ELECTROPHYSIOLOGY    Device Interrogation Performed                                  Date/Time:   12/21/2017  :               [employer]                       Model:       Evia DR-T   dual chamber PPM                         Mode:                   DDDR                                    Rate:      60/130     Atrial Lead:  P wave amplitude:           1.4               mv            Impedance                    409                   Ohms  Threshold                    0.8                      V @    0.5            ms      Ventricular Lead: RV  R wave amplitude        8.3                   mv  Impedance                 487                      Ohms  Threshold                     0.6                    V @     0.5            ms                                          Battery Status:                     Good                    Underlying Rhythm:    Normal sinus rhythm at 86 b/min    Events/Observation:   Multiple episodes of atrial fibrillation. Longest 51 min 12/18/2017    Fastest 170's.   Last 12/18/17 2 seconds  (asymptomatic)                                  History of PAF, on Coumadin.     Impression/Plan:          Normal PPM function.       Normal sensing and pacing via iterative testing. Good battery status. Excellent threshold capture.  No reprogramming.

## 2017-12-21 NOTE — PROGRESS NOTE ADULT - ASSESSMENT
E coli bacteremia / urosepsis,  improving.    CT done  PPM  h/o PCN and levaquin allergy. Patient recalls allergy to sulfa as well.    suggest : continue aztreonam 2 gm iv q 8 hours                follow repeat blood cultures to assess for clearance.

## 2017-12-21 NOTE — DISCHARGE NOTE ADULT - CARE PLAN
Principal Discharge DX:	Sepsis due to Escherichia coli  Goal:	prevent re-infection  Instructions for follow-up, activity and diet:	treated w/ antibiotics in the hospital, follow up w/ your primary care provider within1 week after hospital discharge.  Secondary Diagnosis:	Artificial cardiac pacemaker  Instructions for follow-up, activity and diet:	[employer] pacemaker interrogated in hospital. Follow up w/ your cardiologist within one week of hospital discharge.  Secondary Diagnosis:	Supratherapeutic INR  Goal:	Prevent bleeding  Instructions for follow-up, activity and diet:	Resume Coumadin for A.fib anticoagulation, follow up with your cardiologist within 1-2 days for INR testing and appropriate dosing.  Secondary Diagnosis:	Urinary tract infection without hematuria, site unspecified Principal Discharge DX:	Sepsis due to Escherichia coli  Goal:	prevent re-infection  Instructions for follow-up, activity and diet:	Please complete antibiotic course as prescribed, last day 1/5/2018  Call to make a follow up appt  w/ your primary care provider within1 week after hospital discharge.  Secondary Diagnosis:	Artificial cardiac pacemaker  Instructions for follow-up, activity and diet:	pacemaker interrogated in hospital. Follow up w/ your cardiologist within one week of hospital discharge.  Secondary Diagnosis:	Supratherapeutic INR  Goal:	Prevent bleeding  Instructions for follow-up, activity and diet:	************Continue Coumadin 5 mg daily and repeat INR in 2-3 days. INR on d/c was 2.6*************  Secondary Diagnosis:	Urinary tract infection without hematuria, site unspecified  Goal:	resolved infection Principal Discharge DX:	Sepsis due to Escherichia coli  Goal:	prevent re-infection  Instructions for follow-up, activity and diet:	Please complete antibiotic course as prescribed, last day 1/5/2018  Call to make a follow up appt  w/ your primary care provider within1 week after hospital discharge.  Secondary Diagnosis:	Artificial cardiac pacemaker  Instructions for follow-up, activity and diet:	pacemaker interrogated in hospital. Follow up w/ your cardiologist within one week of hospital discharge.  patient to f/u with Dr Nunes 12/27/17 @ 245 pm  Secondary Diagnosis:	Supratherapeutic INR  Goal:	Prevent bleeding  Instructions for follow-up, activity and diet:	************Continue Coumadin 5 mg daily and repeat INR in 2-3 days. INR on d/c was 2.6*************  Secondary Diagnosis:	Urinary tract infection without hematuria, site unspecified  Goal:	resolved infection

## 2017-12-21 NOTE — DISCHARGE NOTE ADULT - HOSPITAL COURSE
82F h/o HTN, Afib on Coumadin, recent dx of pulm fibrosis presented to ED with generalized weakness, emesis, dizziness x3day, admitted for sepsis 2/2 UTI w/ e.coli     Sepsis:  Plan: as per ID -increase aztreonam 2 gm iv q 8 hours                follow blood and urine cultures               ct abd  / pelvis.      Supratherapeutic INR.  Plan: MONITOR HB and any signs of active bleeding.        Asthma, unspecified asthma severity, unspecified whether complicated, unspecified whether persistent.  Plan: -c/w daily fluticasone.       Gastroesophageal reflux disease, esophagitis presence not specified.  Plan: -c/w protonix.       Atrial fibrillation, unspecified type.  Plan: -hold ac, rate controlled.        Hypertension, unspecified type. Plan: -hold losartan-HCTZ in setting of sepsis    Dispo home 82F h/o HTN, Afib on Coumadin, recent dx of pulm fibrosis presented to ED with generalized weakness, emesis, dizziness x3day, admitted for sepsis 2/2 UTI w/ e.coli  CT Abdomen/Pelvis without evidence of hydronephrosis or abscess. Aztreonam switched to Ceftriaxone 200 mg PO BID on discharge to complete total of 14 day course.   Asthma daily fluticasone.   GERD -c/w protonix.   Atrial fibrillation on Coumadin   Hypertension- losartan-HCTZ resumed      Dispo home

## 2017-12-21 NOTE — DISCHARGE NOTE ADULT - PLAN OF CARE
Prevent bleeding Resume Coumadin for A.fib anticoagulation, follow up with your cardiologist within 1-2 days for INR testing and appropriate dosing. prevent re-infection treated w/ antibiotics in the hospital, follow up w/ your primary care provider within1 week after hospital discharge. [employer] pacemaker interrogated in hospital. Follow up w/ your cardiologist within one week of hospital discharge. Please complete antibiotic course as prescribed, last day 1/5/2018  Call to make a follow up appt  w/ your primary care provider within1 week after hospital discharge. pacemaker interrogated in hospital. Follow up w/ your cardiologist within one week of hospital discharge. ************Continue Coumadin 5 mg daily and repeat INR in 2-3 days. INR on d/c was 2.6************* resolved infection pacemaker interrogated in hospital. Follow up w/ your cardiologist within one week of hospital discharge.  patient to f/u with Dr Nunes 12/27/17 @ 245 pm

## 2017-12-21 NOTE — DISCHARGE NOTE ADULT - MEDICATION SUMMARY - MEDICATIONS TO CHANGE
I will SWITCH the dose or number of times a day I take the medications listed below when I get home from the hospital:    warfarin 5 mg oral tablet  -- take 7.5 mg Mon-Sat  take 2.5 mg on Sun    Coumadin  -- take 7.5 mg Mon-Sat  take 2.5 mg on Sun

## 2017-12-22 LAB
-  AMIKACIN: SIGNIFICANT CHANGE UP
-  AMPICILLIN/SULBACTAM: SIGNIFICANT CHANGE UP
-  AMPICILLIN: SIGNIFICANT CHANGE UP
-  AZTREONAM: SIGNIFICANT CHANGE UP
-  CEFAZOLIN: SIGNIFICANT CHANGE UP
-  CEFEPIME: SIGNIFICANT CHANGE UP
-  CEFOXITIN: SIGNIFICANT CHANGE UP
-  CEFTAZIDIME: SIGNIFICANT CHANGE UP
-  CEFTRIAXONE: SIGNIFICANT CHANGE UP
-  CIPROFLOXACIN: SIGNIFICANT CHANGE UP
-  ERTAPENEM: SIGNIFICANT CHANGE UP
-  GENTAMICIN: SIGNIFICANT CHANGE UP
-  IMIPENEM: SIGNIFICANT CHANGE UP
-  LEVOFLOXACIN: SIGNIFICANT CHANGE UP
-  MEROPENEM: SIGNIFICANT CHANGE UP
-  PIPERACILLIN/TAZOBACTAM: SIGNIFICANT CHANGE UP
-  TIGECYCLINE: SIGNIFICANT CHANGE UP
-  TOBRAMYCIN: SIGNIFICANT CHANGE UP
-  TRIMETHOPRIM/SULFAMETHOXAZOLE: SIGNIFICANT CHANGE UP
ALBUMIN SERPL ELPH-MCNC: 2.8 G/DL — LOW (ref 3.3–5)
ALP SERPL-CCNC: 69 U/L — SIGNIFICANT CHANGE UP (ref 40–120)
ALT FLD-CCNC: 20 U/L — SIGNIFICANT CHANGE UP (ref 4–33)
APTT BLD: 38.5 SEC — HIGH (ref 27.5–37.4)
AST SERPL-CCNC: 31 U/L — SIGNIFICANT CHANGE UP (ref 4–32)
BACTERIA BLD CULT: SIGNIFICANT CHANGE UP
BACTERIA BLD CULT: SIGNIFICANT CHANGE UP
BASOPHILS # BLD AUTO: 0.01 K/UL — SIGNIFICANT CHANGE UP (ref 0–0.2)
BASOPHILS NFR BLD AUTO: 0.3 % — SIGNIFICANT CHANGE UP (ref 0–2)
BILIRUB SERPL-MCNC: 0.3 MG/DL — SIGNIFICANT CHANGE UP (ref 0.2–1.2)
BUN SERPL-MCNC: 8 MG/DL — SIGNIFICANT CHANGE UP (ref 7–23)
BUN SERPL-MCNC: 8 MG/DL — SIGNIFICANT CHANGE UP (ref 7–23)
CALCIUM SERPL-MCNC: 7.8 MG/DL — LOW (ref 8.4–10.5)
CALCIUM SERPL-MCNC: 7.8 MG/DL — LOW (ref 8.4–10.5)
CHLORIDE SERPL-SCNC: 105 MMOL/L — SIGNIFICANT CHANGE UP (ref 98–107)
CHLORIDE SERPL-SCNC: 105 MMOL/L — SIGNIFICANT CHANGE UP (ref 98–107)
CO2 SERPL-SCNC: 22 MMOL/L — SIGNIFICANT CHANGE UP (ref 22–31)
CO2 SERPL-SCNC: 22 MMOL/L — SIGNIFICANT CHANGE UP (ref 22–31)
CREAT SERPL-MCNC: 0.56 MG/DL — SIGNIFICANT CHANGE UP (ref 0.5–1.3)
CREAT SERPL-MCNC: 0.56 MG/DL — SIGNIFICANT CHANGE UP (ref 0.5–1.3)
E COLI DNA BLD POS QL NAA+NON-PROBE: SIGNIFICANT CHANGE UP
EOSINOPHIL # BLD AUTO: 0.09 K/UL — SIGNIFICANT CHANGE UP (ref 0–0.5)
EOSINOPHIL NFR BLD AUTO: 2.4 % — SIGNIFICANT CHANGE UP (ref 0–6)
GLUCOSE SERPL-MCNC: 97 MG/DL — SIGNIFICANT CHANGE UP (ref 70–99)
GLUCOSE SERPL-MCNC: 97 MG/DL — SIGNIFICANT CHANGE UP (ref 70–99)
HCT VFR BLD CALC: 29.4 % — LOW (ref 34.5–45)
HCT VFR BLD CALC: 29.4 % — LOW (ref 34.5–45)
HGB BLD-MCNC: 9.5 G/DL — LOW (ref 11.5–15.5)
HGB BLD-MCNC: 9.5 G/DL — LOW (ref 11.5–15.5)
IMM GRANULOCYTES # BLD AUTO: 0.03 # — SIGNIFICANT CHANGE UP
IMM GRANULOCYTES NFR BLD AUTO: 0.8 % — SIGNIFICANT CHANGE UP (ref 0–1.5)
INR BLD: 2.02 — HIGH (ref 0.88–1.17)
LYMPHOCYTES # BLD AUTO: 1.11 K/UL — SIGNIFICANT CHANGE UP (ref 1–3.3)
LYMPHOCYTES # BLD AUTO: 29.8 % — SIGNIFICANT CHANGE UP (ref 13–44)
MAGNESIUM SERPL-MCNC: 1.6 MG/DL — SIGNIFICANT CHANGE UP (ref 1.6–2.6)
MCHC RBC-ENTMCNC: 28.2 PG — SIGNIFICANT CHANGE UP (ref 27–34)
MCHC RBC-ENTMCNC: 28.2 PG — SIGNIFICANT CHANGE UP (ref 27–34)
MCHC RBC-ENTMCNC: 32.3 % — SIGNIFICANT CHANGE UP (ref 32–36)
MCHC RBC-ENTMCNC: 32.3 % — SIGNIFICANT CHANGE UP (ref 32–36)
MCV RBC AUTO: 87.2 FL — SIGNIFICANT CHANGE UP (ref 80–100)
MCV RBC AUTO: 87.2 FL — SIGNIFICANT CHANGE UP (ref 80–100)
METHOD TYPE: SIGNIFICANT CHANGE UP
MONOCYTES # BLD AUTO: 0.61 K/UL — SIGNIFICANT CHANGE UP (ref 0–0.9)
MONOCYTES NFR BLD AUTO: 16.4 % — HIGH (ref 2–14)
NEUTROPHILS # BLD AUTO: 1.88 K/UL — SIGNIFICANT CHANGE UP (ref 1.8–7.4)
NEUTROPHILS NFR BLD AUTO: 50.3 % — SIGNIFICANT CHANGE UP (ref 43–77)
NRBC # FLD: 0 — SIGNIFICANT CHANGE UP
NRBC # FLD: 0 — SIGNIFICANT CHANGE UP
PHOSPHATE SERPL-MCNC: 3.2 MG/DL — SIGNIFICANT CHANGE UP (ref 2.5–4.5)
PLATELET # BLD AUTO: 195 K/UL — SIGNIFICANT CHANGE UP (ref 150–400)
PLATELET # BLD AUTO: 195 K/UL — SIGNIFICANT CHANGE UP (ref 150–400)
PMV BLD: 10 FL — SIGNIFICANT CHANGE UP (ref 7–13)
PMV BLD: 10 FL — SIGNIFICANT CHANGE UP (ref 7–13)
POTASSIUM SERPL-MCNC: 3.4 MMOL/L — LOW (ref 3.5–5.3)
POTASSIUM SERPL-MCNC: 3.4 MMOL/L — LOW (ref 3.5–5.3)
POTASSIUM SERPL-SCNC: 3.4 MMOL/L — LOW (ref 3.5–5.3)
POTASSIUM SERPL-SCNC: 3.4 MMOL/L — LOW (ref 3.5–5.3)
PROT SERPL-MCNC: 5.7 G/DL — LOW (ref 6–8.3)
PROTHROM AB SERPL-ACNC: 22.8 SEC — HIGH (ref 9.8–13.1)
RBC # BLD: 3.37 M/UL — LOW (ref 3.8–5.2)
RBC # BLD: 3.37 M/UL — LOW (ref 3.8–5.2)
RBC # FLD: 13.5 % — SIGNIFICANT CHANGE UP (ref 10.3–14.5)
RBC # FLD: 13.5 % — SIGNIFICANT CHANGE UP (ref 10.3–14.5)
SODIUM SERPL-SCNC: 139 MMOL/L — SIGNIFICANT CHANGE UP (ref 135–145)
SODIUM SERPL-SCNC: 139 MMOL/L — SIGNIFICANT CHANGE UP (ref 135–145)
WBC # BLD: 3.73 K/UL — LOW (ref 3.8–10.5)
WBC # BLD: 3.73 K/UL — LOW (ref 3.8–10.5)
WBC # FLD AUTO: 3.73 K/UL — LOW (ref 3.8–10.5)
WBC # FLD AUTO: 3.73 K/UL — LOW (ref 3.8–10.5)

## 2017-12-22 PROCEDURE — 99232 SBSQ HOSP IP/OBS MODERATE 35: CPT | Mod: GC

## 2017-12-22 RX ORDER — DILTIAZEM HCL 120 MG
180 CAPSULE, EXT RELEASE 24 HR ORAL DAILY
Qty: 0 | Refills: 0 | Status: DISCONTINUED | OUTPATIENT
Start: 2017-12-22 | End: 2017-12-24

## 2017-12-22 RX ORDER — CEFTRIAXONE 500 MG/1
INJECTION, POWDER, FOR SOLUTION INTRAMUSCULAR; INTRAVENOUS
Qty: 0 | Refills: 0 | Status: DISCONTINUED | OUTPATIENT
Start: 2017-12-22 | End: 2017-12-24

## 2017-12-22 RX ORDER — WARFARIN SODIUM 2.5 MG/1
7.5 TABLET ORAL ONCE
Qty: 0 | Refills: 0 | Status: COMPLETED | OUTPATIENT
Start: 2017-12-22 | End: 2017-12-22

## 2017-12-22 RX ORDER — POTASSIUM CHLORIDE 20 MEQ
20 PACKET (EA) ORAL ONCE
Qty: 0 | Refills: 0 | Status: COMPLETED | OUTPATIENT
Start: 2017-12-22 | End: 2017-12-22

## 2017-12-22 RX ORDER — CEFTRIAXONE 500 MG/1
2 INJECTION, POWDER, FOR SOLUTION INTRAMUSCULAR; INTRAVENOUS EVERY 24 HOURS
Qty: 0 | Refills: 0 | Status: DISCONTINUED | OUTPATIENT
Start: 2017-12-23 | End: 2017-12-24

## 2017-12-22 RX ORDER — WARFARIN SODIUM 2.5 MG/1
TABLET ORAL
Qty: 0 | Refills: 0 | Status: DISCONTINUED | OUTPATIENT
Start: 2017-12-22 | End: 2017-12-22

## 2017-12-22 RX ORDER — CEFTRIAXONE 500 MG/1
2 INJECTION, POWDER, FOR SOLUTION INTRAMUSCULAR; INTRAVENOUS ONCE
Qty: 0 | Refills: 0 | Status: COMPLETED | OUTPATIENT
Start: 2017-12-22 | End: 2017-12-22

## 2017-12-22 RX ADMIN — PANTOPRAZOLE SODIUM 40 MILLIGRAM(S): 20 TABLET, DELAYED RELEASE ORAL at 05:36

## 2017-12-22 RX ADMIN — Medication 1 PUFF(S): at 09:59

## 2017-12-22 RX ADMIN — Medication 180 MILLIGRAM(S): at 17:59

## 2017-12-22 RX ADMIN — Medication 20 MILLIEQUIVALENT(S): at 17:59

## 2017-12-22 RX ADMIN — Medication 1 PUFF(S): at 21:18

## 2017-12-22 RX ADMIN — ATORVASTATIN CALCIUM 10 MILLIGRAM(S): 80 TABLET, FILM COATED ORAL at 21:25

## 2017-12-22 RX ADMIN — CEFTRIAXONE 100 GRAM(S): 500 INJECTION, POWDER, FOR SOLUTION INTRAMUSCULAR; INTRAVENOUS at 13:21

## 2017-12-22 RX ADMIN — WARFARIN SODIUM 7.5 MILLIGRAM(S): 2.5 TABLET ORAL at 18:06

## 2017-12-22 RX ADMIN — Medication 100 MILLIGRAM(S): at 05:36

## 2017-12-22 NOTE — PROGRESS NOTE ADULT - ASSESSMENT
83 yo F PMH A-Fib on Coumadin, Recent Diagnosis of Pulmonary Fibrosis, ?RA, PPM placement who presented to the ED on 12/19/17 with generalized weakness, emesis and dizziness of ~3 day duration. Found to be bacteremic with E. coli. U/A with > 50 WBC and E. coli on urine culture. CT Abdomen/Pelvis without evidence of hydronephrosis or abscess. Currently improving on Aztreonam.  Patient with PCN allergy (rash), Fluoroquinolone Allergy (Rash) and Sulfa Allergy (Unknown). 83 yo F PMH A-Fib on Coumadin, Recent Diagnosis of Pulmonary Fibrosis, ?RA, PPM placement who presented to the ED on 12/19/17 with generalized weakness, emesis and dizziness of ~3 day duration. Found to be bacteremic with E. coli. U/A with > 50 WBC and E. coli on urine culture. CT Abdomen/Pelvis without evidence of hydronephrosis or abscess. Currently improving on Aztreonam.  Patient with PCN allergy (rash), Fluoroquinolone Allergy (Rash) and Sulfa Allergy (Unknown). Would switch to Ceftriaxone with tentative plan to switch to Cefpodoxime 200 mg PO BID on discharge (Ceftriaxone doses until Sunday Night/Monday late AM) to complete total of 14 day course.   --Stop Aztreonam  --Start Ceftriaxone 2g IV Q24H (Order Placed)

## 2017-12-23 LAB
ALBUMIN SERPL ELPH-MCNC: 3.1 G/DL — LOW (ref 3.3–5)
ALP SERPL-CCNC: 78 U/L — SIGNIFICANT CHANGE UP (ref 40–120)
ALT FLD-CCNC: 20 U/L — SIGNIFICANT CHANGE UP (ref 4–33)
AST SERPL-CCNC: 26 U/L — SIGNIFICANT CHANGE UP (ref 4–32)
BILIRUB SERPL-MCNC: 0.2 MG/DL — SIGNIFICANT CHANGE UP (ref 0.2–1.2)
BUN SERPL-MCNC: 7 MG/DL — SIGNIFICANT CHANGE UP (ref 7–23)
CALCIUM SERPL-MCNC: 8 MG/DL — LOW (ref 8.4–10.5)
CHLORIDE SERPL-SCNC: 105 MMOL/L — SIGNIFICANT CHANGE UP (ref 98–107)
CO2 SERPL-SCNC: 23 MMOL/L — SIGNIFICANT CHANGE UP (ref 22–31)
CREAT SERPL-MCNC: 0.61 MG/DL — SIGNIFICANT CHANGE UP (ref 0.5–1.3)
GLUCOSE SERPL-MCNC: 112 MG/DL — HIGH (ref 70–99)
HCT VFR BLD CALC: 32.2 % — LOW (ref 34.5–45)
HGB BLD-MCNC: 10.3 G/DL — LOW (ref 11.5–15.5)
INR BLD: 1.98 — HIGH (ref 0.88–1.17)
MCHC RBC-ENTMCNC: 28.3 PG — SIGNIFICANT CHANGE UP (ref 27–34)
MCHC RBC-ENTMCNC: 32 % — SIGNIFICANT CHANGE UP (ref 32–36)
MCV RBC AUTO: 88.5 FL — SIGNIFICANT CHANGE UP (ref 80–100)
NRBC # FLD: 0 — SIGNIFICANT CHANGE UP
PLATELET # BLD AUTO: 259 K/UL — SIGNIFICANT CHANGE UP (ref 150–400)
PMV BLD: 9.4 FL — SIGNIFICANT CHANGE UP (ref 7–13)
POTASSIUM SERPL-MCNC: 3.7 MMOL/L — SIGNIFICANT CHANGE UP (ref 3.5–5.3)
POTASSIUM SERPL-SCNC: 3.7 MMOL/L — SIGNIFICANT CHANGE UP (ref 3.5–5.3)
PROT SERPL-MCNC: 6.3 G/DL — SIGNIFICANT CHANGE UP (ref 6–8.3)
PROTHROM AB SERPL-ACNC: 22.3 SEC — HIGH (ref 9.8–13.1)
RBC # BLD: 3.64 M/UL — LOW (ref 3.8–5.2)
RBC # FLD: 13.6 % — SIGNIFICANT CHANGE UP (ref 10.3–14.5)
SODIUM SERPL-SCNC: 141 MMOL/L — SIGNIFICANT CHANGE UP (ref 135–145)
SPECIMEN SOURCE: SIGNIFICANT CHANGE UP
SPECIMEN SOURCE: SIGNIFICANT CHANGE UP
WBC # BLD: 5.5 K/UL — SIGNIFICANT CHANGE UP (ref 3.8–10.5)
WBC # FLD AUTO: 5.5 K/UL — SIGNIFICANT CHANGE UP (ref 3.8–10.5)

## 2017-12-23 RX ORDER — WARFARIN SODIUM 2.5 MG/1
7.5 TABLET ORAL ONCE
Qty: 0 | Refills: 0 | Status: COMPLETED | OUTPATIENT
Start: 2017-12-23 | End: 2017-12-23

## 2017-12-23 RX ORDER — POTASSIUM CHLORIDE 20 MEQ
40 PACKET (EA) ORAL ONCE
Qty: 0 | Refills: 0 | Status: DISCONTINUED | OUTPATIENT
Start: 2017-12-23 | End: 2017-12-23

## 2017-12-23 RX ORDER — WARFARIN SODIUM 2.5 MG/1
7.5 TABLET ORAL ONCE
Qty: 0 | Refills: 0 | Status: DISCONTINUED | OUTPATIENT
Start: 2017-12-23 | End: 2017-12-23

## 2017-12-23 RX ADMIN — WARFARIN SODIUM 7.5 MILLIGRAM(S): 2.5 TABLET ORAL at 18:51

## 2017-12-23 RX ADMIN — Medication 650 MILLIGRAM(S): at 07:30

## 2017-12-23 RX ADMIN — Medication 650 MILLIGRAM(S): at 06:55

## 2017-12-23 RX ADMIN — Medication 1 PUFF(S): at 21:40

## 2017-12-23 RX ADMIN — Medication 180 MILLIGRAM(S): at 06:51

## 2017-12-23 RX ADMIN — CEFTRIAXONE 100 GRAM(S): 500 INJECTION, POWDER, FOR SOLUTION INTRAMUSCULAR; INTRAVENOUS at 12:45

## 2017-12-23 RX ADMIN — PANTOPRAZOLE SODIUM 40 MILLIGRAM(S): 20 TABLET, DELAYED RELEASE ORAL at 06:51

## 2017-12-23 RX ADMIN — ATORVASTATIN CALCIUM 10 MILLIGRAM(S): 80 TABLET, FILM COATED ORAL at 21:37

## 2017-12-23 NOTE — PROGRESS NOTE ADULT - PROBLEM SELECTOR PLAN 2
MONITOR HB and any signs of active bleeding

## 2017-12-23 NOTE — PROGRESS NOTE ADULT - PROBLEM SELECTOR PLAN 1
as per ID - pt Found to be bacteremic with E. coli. U/A with > 50 WBC and E. coli on urine culture. CT Abdomen/Pelvis without evidence of hydronephrosis or abscess. Currently improving on Aztreonam.  Patient with PCN allergy (rash), Fluoroquinolone Allergy (Rash) and Sulfa Allergy (Unknown). Would switch to Ceftriaxone with tentative plan to switch to Cefpodoxime 200 mg PO BID on discharge (Ceftriaxone doses until Sunday Night/Monday late AM) to complete total of 14 day course.
as per ID - pt Found to be bacteremic with E. coli. U/A with > 50 WBC and E. coli on urine culture. CT Abdomen/Pelvis without evidence of hydronephrosis or abscess. Currently improving on Aztreonam.  Patient with PCN allergy (rash), Fluoroquinolone Allergy (Rash) and Sulfa Allergy (Unknown). Would switch to Ceftriaxone with tentative plan to switch to Cefpodoxime 200 mg PO BID on discharge (Ceftriaxone doses until Sunday Night/Monday late AM) to complete total of 14 day course.
as per ID -increase aztreonam 2 gm iv q 8 hours                follow blood and urine cultures               ct abd  / pelvis
as per ID -increase aztreonam 2 gm iv q 8 hours                follow blood and urine cultures               ct abd  / pelvis

## 2017-12-23 NOTE — PROGRESS NOTE ADULT - PROBLEM SELECTOR PLAN 6
-hold losartan-HCTZ in setting of sepsis

## 2017-12-23 NOTE — PROGRESS NOTE ADULT - PROBLEM SELECTOR PROBLEM 4
Gastroesophageal reflux disease, esophagitis presence not specified

## 2017-12-23 NOTE — PROGRESS NOTE ADULT - PROBLEM SELECTOR PROBLEM 3
Asthma, unspecified asthma severity, unspecified whether complicated, unspecified whether persistent

## 2017-12-23 NOTE — PROGRESS NOTE ADULT - PROBLEM SELECTOR PLAN 5
-hold ac, rate controlled

## 2017-12-23 NOTE — PROGRESS NOTE ADULT - ATTENDING COMMENTS
Agree with above.   -Hold ac given supratherapeutic INR  -f/u BCx and ID  -PPM interrogation pending    Chemo Fung MD  [facility]  [address] [place] [address], [address]  [place] [address]  office: [phone]  pager: [phone]
Agree with above.   -Interrogate device    Chemo Fung MD  [facility]  [address] [facility], [address]  [place] [address]  office: [phone]  pager: [phone]
CARDIOLOGY ATTENDING    Agree with above. Continue AV tyrell blockers and lifelong A/C for PAF with elevated chads-vasc.
Patient seen and examined.  Agree with above.   -Continue with delphine Fung MD  [facility]  [address] [facility], [address]  [place] [address]  office: [phone]  pager: [phone]
E coli bacteremia due to UTI. Clinically improving on aztreonam.  h/o allergy augmentin years ago (rash).  sulfa as well.  levaquin allergy more recent.  Would switch to ceftriaxone iv daily (first dose now).  If tolerates and continues to improve can transition to oral cephalosporin on 12/24 to complete total 14 days tx.

## 2017-12-24 VITALS — DIASTOLIC BLOOD PRESSURE: 78 MMHG | SYSTOLIC BLOOD PRESSURE: 160 MMHG

## 2017-12-24 LAB
BASOPHILS # BLD AUTO: 0.02 K/UL — SIGNIFICANT CHANGE UP (ref 0–0.2)
BASOPHILS NFR BLD AUTO: 0.4 % — SIGNIFICANT CHANGE UP (ref 0–2)
BUN SERPL-MCNC: 6 MG/DL — LOW (ref 7–23)
CALCIUM SERPL-MCNC: 8.3 MG/DL — LOW (ref 8.4–10.5)
CHLORIDE SERPL-SCNC: 104 MMOL/L — SIGNIFICANT CHANGE UP (ref 98–107)
CO2 SERPL-SCNC: 24 MMOL/L — SIGNIFICANT CHANGE UP (ref 22–31)
CREAT SERPL-MCNC: 0.52 MG/DL — SIGNIFICANT CHANGE UP (ref 0.5–1.3)
EOSINOPHIL # BLD AUTO: 0.26 K/UL — SIGNIFICANT CHANGE UP (ref 0–0.5)
EOSINOPHIL NFR BLD AUTO: 5.6 % — SIGNIFICANT CHANGE UP (ref 0–6)
GLUCOSE SERPL-MCNC: 94 MG/DL — SIGNIFICANT CHANGE UP (ref 70–99)
HCT VFR BLD CALC: 32.3 % — LOW (ref 34.5–45)
HGB BLD-MCNC: 10.4 G/DL — LOW (ref 11.5–15.5)
IMM GRANULOCYTES # BLD AUTO: 0.03 # — SIGNIFICANT CHANGE UP
IMM GRANULOCYTES NFR BLD AUTO: 0.6 % — SIGNIFICANT CHANGE UP (ref 0–1.5)
INR BLD: 2.61 — HIGH (ref 0.88–1.17)
LYMPHOCYTES # BLD AUTO: 1.32 K/UL — SIGNIFICANT CHANGE UP (ref 1–3.3)
LYMPHOCYTES # BLD AUTO: 28.5 % — SIGNIFICANT CHANGE UP (ref 13–44)
MAGNESIUM SERPL-MCNC: 1.8 MG/DL — SIGNIFICANT CHANGE UP (ref 1.6–2.6)
MCHC RBC-ENTMCNC: 28 PG — SIGNIFICANT CHANGE UP (ref 27–34)
MCHC RBC-ENTMCNC: 32.2 % — SIGNIFICANT CHANGE UP (ref 32–36)
MCV RBC AUTO: 87.1 FL — SIGNIFICANT CHANGE UP (ref 80–100)
MONOCYTES # BLD AUTO: 0.73 K/UL — SIGNIFICANT CHANGE UP (ref 0–0.9)
MONOCYTES NFR BLD AUTO: 15.8 % — HIGH (ref 2–14)
NEUTROPHILS # BLD AUTO: 2.27 K/UL — SIGNIFICANT CHANGE UP (ref 1.8–7.4)
NEUTROPHILS NFR BLD AUTO: 49.1 % — SIGNIFICANT CHANGE UP (ref 43–77)
NRBC # FLD: 0 — SIGNIFICANT CHANGE UP
PLATELET # BLD AUTO: 264 K/UL — SIGNIFICANT CHANGE UP (ref 150–400)
PMV BLD: 9.4 FL — SIGNIFICANT CHANGE UP (ref 7–13)
POTASSIUM SERPL-MCNC: 3.8 MMOL/L — SIGNIFICANT CHANGE UP (ref 3.5–5.3)
POTASSIUM SERPL-SCNC: 3.8 MMOL/L — SIGNIFICANT CHANGE UP (ref 3.5–5.3)
PROTHROM AB SERPL-ACNC: 29.5 SEC — HIGH (ref 9.8–13.1)
RBC # BLD: 3.71 M/UL — LOW (ref 3.8–5.2)
RBC # FLD: 13.7 % — SIGNIFICANT CHANGE UP (ref 10.3–14.5)
SODIUM SERPL-SCNC: 140 MMOL/L — SIGNIFICANT CHANGE UP (ref 135–145)
WBC # BLD: 4.63 K/UL — SIGNIFICANT CHANGE UP (ref 3.8–10.5)
WBC # FLD AUTO: 4.63 K/UL — SIGNIFICANT CHANGE UP (ref 3.8–10.5)

## 2017-12-24 RX ORDER — POTASSIUM CHLORIDE 20 MEQ
40 PACKET (EA) ORAL ONCE
Qty: 0 | Refills: 0 | Status: DISCONTINUED | OUTPATIENT
Start: 2017-12-24 | End: 2017-12-24

## 2017-12-24 RX ORDER — CEFPODOXIME PROXETIL 100 MG
1 TABLET ORAL
Qty: 24 | Refills: 0 | OUTPATIENT
Start: 2017-12-24 | End: 2018-01-04

## 2017-12-24 RX ORDER — LACTOBACILLUS ACIDOPHILUS 100MM CELL
1 CAPSULE ORAL
Qty: 10 | Refills: 0 | OUTPATIENT
Start: 2017-12-24 | End: 2018-01-02

## 2017-12-24 RX ORDER — WARFARIN SODIUM 2.5 MG/1
0 TABLET ORAL
Qty: 0 | Refills: 0 | COMMUNITY

## 2017-12-24 RX ORDER — POTASSIUM CHLORIDE 20 MEQ
40 PACKET (EA) ORAL ONCE
Qty: 0 | Refills: 0 | Status: COMPLETED | OUTPATIENT
Start: 2017-12-24 | End: 2017-12-24

## 2017-12-24 RX ORDER — DILTIAZEM HCL 120 MG
1 CAPSULE, EXT RELEASE 24 HR ORAL
Qty: 0 | Refills: 0 | COMMUNITY
Start: 2017-12-24

## 2017-12-24 RX ORDER — WARFARIN SODIUM 2.5 MG/1
2.5 TABLET ORAL ONCE
Qty: 0 | Refills: 0 | Status: COMPLETED | OUTPATIENT
Start: 2017-12-24 | End: 2017-12-24

## 2017-12-24 RX ADMIN — CEFTRIAXONE 100 GRAM(S): 500 INJECTION, POWDER, FOR SOLUTION INTRAMUSCULAR; INTRAVENOUS at 11:50

## 2017-12-24 RX ADMIN — PANTOPRAZOLE SODIUM 40 MILLIGRAM(S): 20 TABLET, DELAYED RELEASE ORAL at 06:06

## 2017-12-24 RX ADMIN — Medication 180 MILLIGRAM(S): at 06:06

## 2017-12-24 RX ADMIN — Medication 40 MILLIEQUIVALENT(S): at 12:48

## 2017-12-24 RX ADMIN — WARFARIN SODIUM 2.5 MILLIGRAM(S): 2.5 TABLET ORAL at 14:51

## 2017-12-24 NOTE — PROGRESS NOTE ADULT - PROVIDER SPECIALTY LIST ADULT
Cardiology
Infectious Disease
Internal Medicine
Cardiology

## 2017-12-24 NOTE — PROGRESS NOTE ADULT - SUBJECTIVE AND OBJECTIVE BOX
SUBJECTIVE: feeling much better    MEDICATIONS  (STANDING):  atorvastatin 10 milliGRAM(s) Oral at bedtime  cefTRIAXone   IVPB      diltiazem    milliGRAM(s) Oral daily  fluticasone propionate   110 MICROgram(s) HFA Inhaler 1 Puff(s) Inhalation two times a day  furosemide    Tablet 20 milliGRAM(s) Oral once  pantoprazole    Tablet 40 milliGRAM(s) Oral before breakfast  potassium chloride   Powder 20 milliEquivalent(s) Oral once  warfarin 7.5 milliGRAM(s) Oral once    MEDICATIONS  (PRN):  acetaminophen   Tablet 650 milliGRAM(s) Oral every 6 hours PRN For Temp greater than 38 C (100.4 F)  acetaminophen   Tablet. 650 milliGRAM(s) Oral every 6 hours PRN mild and moderate pain (1-6)  ondansetron Injectable 4 milliGRAM(s) IV Push every 6 hours PRN Nausea and/or Vomiting      LABS:                        9.5    3.73  )-----------( 195      ( 22 Dec 2017 06:00 )             29.4     Hemoglobin: 9.5 g/dL (12-22 @ 06:00)  Hemoglobin: 9.5 g/dL (12-22 @ 06:00)  Hemoglobin: 9.4 g/dL (12-21 @ 06:30)  Hemoglobin: 10.4 g/dL (12-20 @ 21:32)  Hemoglobin: 9.9 g/dL (12-20 @ 14:55)    12-22    139  |  105  |  8   ----------------------------<  97  3.4<L>   |  22  |  0.56    Ca    7.8<L>      22 Dec 2017 06:00  Phos  3.2     12-22  Mg     1.6     12-22    TPro  5.7<L>  /  Alb  2.8<L>  /  TBili  0.3  /  DBili  x   /  AST  31  /  ALT  20  /  AlkPhos  69  12-22    Creatinine Trend: 0.56<--, 0.60<--, 0.64<--, 0.80<--   PT/INR - ( 22 Dec 2017 06:00 )   PT: 22.8 SEC;   INR: 2.02          PTT - ( 22 Dec 2017 06:00 )  PTT:38.5 SEC        PHYSICAL EXAM  Vital Signs Last 24 Hrs  T(C): 36.4 (22 Dec 2017 14:49), Max: 37.2 (21 Dec 2017 21:23)  T(F): 97.6 (22 Dec 2017 14:49), Max: 99 (21 Dec 2017 21:23)  HR: 94 (22 Dec 2017 14:49) (83 - 94)  BP: 164/91 (22 Dec 2017 14:49) (137/74 - 164/91)  BP(mean): --  RR: 18 (22 Dec 2017 14:49) (18 - 18)  SpO2: 96% (22 Dec 2017 14:49) (95% - 96%)    Cardiovascular:  S1S2 RRR, No JVD, 1/6 ERENDIRA   Respiratory: Lungs clear to auscultation, normal effort  Gastrointestinal: Abdomen soft, ND, NT, +BS  Ext: No C/C/E BL LE    DIAGNOSTIC DATA    < from: Cardiac Cath Lab - Adult (03.25.16 @ 11:03) >  CORONARY VESSELS: The coronary circulation is left dominant.  LM:   --  LM: Normal.  LAD:   --  Proximal LAD: Normal.  --  Mid LAD: Angiography showed minor luminal irregularities with no flow  limiting lesions.  --  Distal LAD: Angiography showed minor luminal irregularities with no  flow limiting lesions.  --  D1: Angiography showed minor luminal irregularities with no flow  limiting lesions.  CX:   --  Circumflex: Normal.  --  OM1: Normal.  RCA:   --  RCA: Normal. Non-dominant RCA.  COMPLICATIONS: There were no complications.  DIAGNOSTIC RECOMMENDATIONS: Medical management and aggressive risk factor  modification is recommended.    TTE: in office 6/17   Conclusions:   1. Normal left ventricular size with normal systolic function.   2. Mild diastolic dysfunction.   3. Normal right ventricular size and function. Device wire in the right heart.   4. Minimal aortic regurgitation.     DEVICE INTERROGATION    Impression/Plan:          Normal PPM function.       Normal sensing and pacing via iterative testing. Good battery status. Excellent threshold capture.  No reprogramming.    ASSESSMENT AND PLAN:    Ms. Martinez is a very pleasant 82 year old woman well known to our service  with history of paroxysmal atrial fibrillation, on Coumadin, sick sinus syndrome, Biotronik dual chamber pacemaker, Non obstructive CAD on cardiac cath 3/16 with historically NL LV function on TTE 3/16,  pulmonary fibrosis, hypertension, who presents with vaginal bleeding in the setting of supratherapeutic INR as well as  generalized weakness, emesis, dizziness x3day. She had a pessary placed approx 1 week ago by Dr. Preciado after which she developed progressively worsening vaginal bleeding.   She self removed the pessary per UroGyn recs but as she persisted in having vaginal spotting as well as weakness and dizziness, she came to the ER where she was found to be  febrile and tachycardic, found with E coli bacteremia, urinary source    - IV abx per ID - f/u cultures   -  Biotoronik PPM  Interrogated NL PPM function good battery life   - PAF - with elevated Chads Vasc Score Life Long A/C recommended PT on Coumadin restarted by primary team INR goal 2-3     restart Cardizem for rate control and BP control   - cont to hold losartan & HCTZ in setting of sepsis  for now      - patient to f/u with Dr Nunes for cardiology upon D/C
Follow Up:  E coli bacteremia    Interval History/ROS: No chills or fevers overnight. She notes some mild nausea but denies vomiting. Denies any diarrhea. Denies urinary frequency or hesitancy.     Allergies  adhesives (Rash)  Augmentin (Rash)  clavulanate (Rash)  Levaquin (Rash)    ANTIMICROBIALS:  aztreonam  IVPB 2000 every 8 hours    OTHER MEDS:  MEDICATIONS  (STANDING):  acetaminophen   Tablet 650 every 6 hours PRN  acetaminophen   Tablet. 650 every 6 hours PRN  atorvastatin 10 at bedtime  fluticasone propionate   110 MICROgram(s) HFA Inhaler 1 two times a day  furosemide    Tablet 20 once  ondansetron Injectable 4 every 6 hours PRN  pantoprazole    Tablet 40 before breakfast      Vital Signs Last 24 Hrs  T(C): 36.9 (22 Dec 2017 10:11), Max: 37.2 (21 Dec 2017 21:23)  T(F): 98.5 (22 Dec 2017 10:11), Max: 99 (21 Dec 2017 21:23)  HR: 83 (22 Dec 2017 10:11) (83 - 86)  BP: 146/89 (22 Dec 2017 10:11) (131/77 - 148/77)  BP(mean): --  RR: 18 (22 Dec 2017 10:11) (18 - 18)  SpO2: 96% (22 Dec 2017 10:11) (95% - 96%)    PHYSICAL EXAMINATION:  General: Alert and Awake, NAD  HEENT: PERRL, EOMI, No subconjunctival hemorrhages, Oropharynx Clear, MMM  Neck: Supple, No REENA  Cardiac: RRR, No M/R/G  Resp: CTAB, No Wh/Rh/Ra  Abdomen: NBS, NT/ND, No HSM, No rigidity or guarding  MSK: No LE edema. No calf tenderness  : No washington  Skin: No rashes or lesions. Skin is warm and dry to the touch.   Neuro: Alert and Awake. CN 2-12 Grossly intact. Moves all four extremities spontaneously.  Psych: Calm, Pleasant, Cooperative                          9.5    3.73  )-----------( 195      ( 22 Dec 2017 06:00 )             29.4       12-22    139  |  105  |  8   ----------------------------<  97  3.4<L>   |  22  |  0.56    Ca    7.8<L>      22 Dec 2017 06:00  Phos  3.2     12-22  Mg     1.6     12-22    TPro  5.7<L>  /  Alb  2.8<L>  /  TBili  0.3  /  DBili  x   /  AST  31  /  ALT  20  /  AlkPhos  69  12-22    MICROBIOLOGY:    URINE MIDSTREAM  12-19-17 --  --  Escherichia coli  Culture - Urine (12.19.17 @ 14:30)    -  Amikacin: S <=16 DOUG    -  Ampicillin: R >16 DOUG    -  Ampicillin/Sulbactam: R >16/8 DOUG    -  Aztreonam: S <=4 DOUG    -  Cefazolin: S <=8 DOUG    -  Cefepime: S <=4 DOUG    -  Cefoxitin: S <=8 DOUG    -  Ceftazidime: S <=1 DOUG    -  Ceftriaxone: S <=1 DOUG    -  Ciprofloxacin: S <=1 DOUG    -  Ertapenem: S <=1 DOUG    -  Gentamicin: S <=4 DOUG    -  Imipenem: S <=1 DOUG    -  Levofloxacin: S <=2 DOUG    -  Meropenem: S <=1 DOUG    -  Nitrofurantoin: S <=32 DOUG    -  Piperacillin/Tazobactam: S <=16 DOUG    -  Tigecycline: S <=2 DOUG    -  Tobramycin: S <=4 DOUG    -  Trimethoprim/Sulfamethoxazole: S <=2/38 DOUG    BLOOD  12-19-17 --  --  BLOOD CULTURE PCR  Escherichia coli    RADIOLOGY:    EXAM:  CT ABDOMEN AND PELVIS    PROCEDURE DATE:  Dec 21 2017   Small bilateral pleural effusions.  No hydronephrosis or nephrolithiasis.    EXAM:  RAD CHEST PA LAT    PROCEDURE DATE:  Dec 19 2017   No acute pulmonary disease.
Follow Up:  urosepsis    Inverval History/ROS:  feels much better than yesterday    Allergies  adhesives (Rash)  Augmentin (Rash)  clavulanate (Rash)  Levaquin (Rash)        ANTIMICROBIALS:  aztreonam  IVPB 2000 every 8 hours      OTHER MEDS:  acetaminophen   Tablet 650 milliGRAM(s) Oral every 6 hours PRN  acetaminophen   Tablet. 650 milliGRAM(s) Oral every 6 hours PRN  atorvastatin 10 milliGRAM(s) Oral at bedtime  fluticasone propionate   110 MICROgram(s) HFA Inhaler 1 Puff(s) Inhalation two times a day  ondansetron Injectable 4 milliGRAM(s) IV Push every 6 hours PRN  pantoprazole    Tablet 40 milliGRAM(s) Oral before breakfast    Vital Signs Last 24 Hrs  T(F): 98.5 (17 @ 11:54), Max: 98.7 (17 @ 08:00)  HR: 86 (17 @ 11:54)  BP: 131/77 (17 @ 11:54)  RR: 18 (17 @ 11:54)  SpO2: 96% (17 @ 11:54) (94% - 98%)    PHYSICAL EXAM:  General: [x ] non-toxic  HEAD/EYES: [ ] PERRL [x ] white sclera [ ] icterus  ENT:  [ ] normal [x ] supple [ ] thrush [ ] pharyngeal exudate  Cardiovascular:   [ ] murmur [ ] normal [x ] PPM/AICD  Respiratory:  [x ] clear to ausculation bilaterally  GI:  [x ] soft, non-tender, normal bowel sounds  :  [ ] [place] [x ] no  CVA tenderness   Musculoskeletal:  [ ] no synovitis  Neurologic:  [x ] non-focal exam   Skin:  [x ] no rash  Lymph: [ ] no lymphadenopathy  Psychiatric:  [x ] appropriate affect [ x] alert & oriented  Lines:  [x ] no phlebitis [ ] central line                                9.4    4.23  )-----------( 172      ( 21 Dec 2017 06:30 )             29.5     136  |  102  |  11  ----------------------------<  106  3.7   |  24  |  0.60  Ca    7.5      21 Dec 2017 06:30Phos  1.8     12-Mg     1.7     12-  TPro  5.6  /  Alb  2.8  /  TBili  0.4  /  DBili  x   /  AST  35  /  ALT  24  /  AlkPhos  61  12-      Urinalysis Basic - ( 19 Dec 2017 09:53 )    Color: YELLOW / Appearance: HAZY / S.014 / pH: 6.5  Gluc: NEGATIVE / Ketone: NEGATIVE  / Bili: NEGATIVE / Urobili: NORMAL mg/dL   Blood: SMALL / Protein: 20 mg/dL / Nitrite: POSITIVE   Leuk Esterase: LARGE / RBC: 5-10 / WBC >50   Sq Epi: x / Non Sq Epi: x / Bacteria: MOD        MICROBIOLOGY:  v  URINE MIDSTREAM  E coli susceptible to aztreonam  17 --  --  --      BLOOD  17 --  --  BLOOD CULTURE PCR  E coli                RADIOLOGY:    < from: CT Abdomen and Pelvis No Cont (17 @ 14:39) >  WER CHEST: Trace bilateral pleural effusions with associated   atelectasis. Incompletely visualized pacing leads.    LIVER: Within normal limits.  BILE DUCTS: Normal caliber.  GALLBLADDER: Within normal limits.  SPLEEN: Within normal limits.  PANCREAS: Within normal limits.  ADRENALS: Within normal limits.  KIDNEYS/URETERS:No hydronephrosis or nephrolithiasis. Left parapelvic   cysts.    BLADDER: Within normal limits.  REPRODUCTIVE ORGANS: The uterus and adnexa are unremarkable. Small right   Bartholin's duct cyst with debris.    BOWEL: Small hiatal hernia. No bowel obstruction. Appendix is normal.   Sigmoid diverticulosis without diverticulitis.  PERITONEUM: No ascites.  VESSELS:  Vascular calcifications.  RETROPERITONEUM: No lymphadenopathy.    ABDOMINAL WALL: Tiny, fat-containing umbilical hernia.  BONES: Degenerative changes of the spine with lumbar levoscoliosis and   mild/moderate left lateral listhesis of L4 on L5.    IMPRESSION:     Small bilateral pleural effusions.    No hydronephrosis or nephrolithiasis.    < end of copied text >
Follow Up:  urosepsis    Inverval History/ROS: c/o low back pain.  feels weak.  chilly; no fever.  blood culture + e coli    Allergies  adhesives (Rash)  Augmentin (Rash)  clavulanate (Rash)  Levaquin (Rash)        ANTIMICROBIALS:  aztreonam  IVPB 2000 every 8 hours      OTHER MEDS:  acetaminophen   Tablet 650 milliGRAM(s) Oral every 6 hours PRN  acetaminophen   Tablet. 650 milliGRAM(s) Oral every 6 hours PRN  atorvastatin 10 milliGRAM(s) Oral at bedtime  fluticasone propionate   110 MICROgram(s) HFA Inhaler 1 Puff(s) Inhalation two times a day  ondansetron Injectable 4 milliGRAM(s) IV Push every 6 hours PRN  pantoprazole    Tablet 40 milliGRAM(s) Oral before breakfast      Vital Signs Last 24 Hrs  T(C): 36.8 (20 Dec 2017 12:00), Max: 38.6 (19 Dec 2017 15:27)  T(F): 98.3 (20 Dec 2017 12:00), Max: 101.5 (19 Dec 2017 15:27)  HR: 90 (20 Dec 2017 12:00) (73 - 113)  BP: 110/65 (20 Dec 2017 12:00) (92/52 - 138/77)  BP(mean): --  RR: 18 (20 Dec 2017 12:00) (14 - 18)  SpO2: 100% (20 Dec 2017 12:00) (93% - 100%)    PHYSICAL EXAM:  General: [x ] non-toxic  HEAD/EYES: [ ] PERRL [x ] white sclera [ ] icterus  ENT:  [ ] normal [x ] supple [ ] thrush [ ] pharyngeal exudate  Cardiovascular:   [ ] murmur [ ] normal [x ] PPM/AICD  Respiratory:  [x ] clear to ausculation bilaterally  GI:  [x ] soft, non-tender, normal bowel sounds  :  [ ] [place] [ ]  CVA tenderness   Musculoskeletal:  [ ] no synovitis  Neurologic:  [x ] non-focal exam   Skin:  [x ] no rash  Lymph: [ ] no lymphadenopathy  Psychiatric:  [x ] appropriate affect [ x] alert & oriented  Lines:  [ ] no phlebitis [ ] central line                                9.9    6.54  )-----------( 206      ( 20 Dec 2017 06:00 )             30.8       12-    137  |  104  |  13  ----------------------------<  103<H>  3.7   |  23  |  0.64    Ca    7.5<L>      20 Dec 2017 06:00  Phos  2.4     12  Mg     1.3         TPro  5.6<L>  /  Alb  2.8<L>  /  TBili  0.4  /  DBili  x   /  AST  35<H>  /  ALT  24  /  AlkPhos  61  12      Urinalysis Basic - ( 19 Dec 2017 09:53 )    Color: YELLOW / Appearance: HAZY / S.014 / pH: 6.5  Gluc: NEGATIVE / Ketone: NEGATIVE  / Bili: NEGATIVE / Urobili: NORMAL mg/dL   Blood: SMALL / Protein: 20 mg/dL / Nitrite: POSITIVE   Leuk Esterase: LARGE / RBC: 5-10 / WBC >50   Sq Epi: x / Non Sq Epi: x / Bacteria: MOD        MICROBIOLOGY:  v  URINE MIDSTREAM  17 --  --  --      BLOOD  17 --  --  BLOOD CULTURE PCR  E coli                RADIOLOGY:    < from: Xray Chest 2 Views PA/Lat (17 @ 11:06) >    IMPRESSION:  No acute pulmonary disease.    < end of copied text >
SUBJECTIVE: Denies chest pain or shortness of breath      MEDICATIONS  (STANDING):  atorvastatin 10 milliGRAM(s) Oral at bedtime  cefTRIAXone   IVPB 2 Gram(s) IV Intermittent every 24 hours  cefTRIAXone   IVPB      diltiazem    milliGRAM(s) Oral daily  fluticasone propionate   110 MICROgram(s) HFA Inhaler 1 Puff(s) Inhalation two times a day  furosemide    Tablet 20 milliGRAM(s) Oral once  pantoprazole    Tablet 40 milliGRAM(s) Oral before breakfast  potassium chloride    Tablet ER 40 milliEquivalent(s) Oral once  warfarin 2.5 milliGRAM(s) Oral once    MEDICATIONS  (PRN):  acetaminophen   Tablet 650 milliGRAM(s) Oral every 6 hours PRN For Temp greater than 38 C (100.4 F)  acetaminophen   Tablet. 650 milliGRAM(s) Oral every 6 hours PRN mild and moderate pain (1-6)  ondansetron Injectable 4 milliGRAM(s) IV Push every 6 hours PRN Nausea and/or Vomiting      LABS:                        10.4   4.63  )-----------( 264      ( 24 Dec 2017 06:25 )             32.3     Hemoglobin: 10.4 g/dL (12-24 @ 06:25)  Hemoglobin: 10.3 g/dL (12-23 @ 11:12)  Hemoglobin: 9.5 g/dL (12-22 @ 06:00)  Hemoglobin: 9.5 g/dL (12-22 @ 06:00)  Hemoglobin: 9.4 g/dL (12-21 @ 06:30)    12-24    140  |  104  |  6<L>  ----------------------------<  94  3.8   |  24  |  0.52    Ca    8.3<L>      24 Dec 2017 06:25  Mg     1.8     12-24    TPro  6.3  /  Alb  3.1<L>  /  TBili  0.2  /  DBili  x   /  AST  26  /  ALT  20  /  AlkPhos  78  12-23    Creatinine Trend: 0.52<--, 0.61<--, 0.56<--, 0.60<--, 0.64<--, 0.80<--   PT/INR - ( 24 Dec 2017 06:25 )   PT: 29.5 SEC;   INR: 2.61         PHYSICAL EXAM  Vital Signs Last 24 Hrs  T(C): 36.9 (24 Dec 2017 05:43), Max: 37.1 (23 Dec 2017 21:45)  T(F): 98.4 (24 Dec 2017 05:43), Max: 98.7 (23 Dec 2017 21:45)  HR: 79 (24 Dec 2017 05:43) (77 - 91)  BP: 126/76 (24 Dec 2017 05:43) (126/76 - 153/82)  BP(mean): --  RR: 18 (24 Dec 2017 05:43) (18 - 20)  SpO2: 98% (24 Dec 2017 05:43) (97% - 99%)      Cardiovascular:  S1S2 RRR, No JVD, 1/6 ERENDIRA   Respiratory: Lungs clear to auscultation, normal effort  Gastrointestinal: Abdomen soft, ND, NT, +BS  Ext: No C/C/E BL LE    DIAGNOSTIC DATA    < from: Cardiac Cath Lab - Adult (03.25.16 @ 11:03) >  CORONARY VESSELS: The coronary circulation is left dominant.  LM:   --  LM: Normal.  LAD:   --  Proximal LAD: Normal.  --  Mid LAD: Angiography showed minor luminal irregularities with no flow  limiting lesions.  --  Distal LAD: Angiography showed minor luminal irregularities with no  flow limiting lesions.  --  D1: Angiography showed minor luminal irregularities with no flow  limiting lesions.  CX:   --  Circumflex: Normal.  --  OM1: Normal.  RCA:   --  RCA: Normal. Non-dominant RCA.  COMPLICATIONS: There were no complications.  DIAGNOSTIC RECOMMENDATIONS: Medical management and aggressive risk factor  modification is recommended.    TTE: in office 6/17   Conclusions:   1. Normal left ventricular size with normal systolic function.   2. Mild diastolic dysfunction.   3. Normal right ventricular size and function. Device wire in the right heart.   4. Minimal aortic regurgitation.     DEVICE INTERROGATION    Impression/Plan:          Normal PPM function.       Normal sensing and pacing via iterative testing. Good battery status. Excellent threshold capture.  No reprogramming.    ASSESSMENT AND PLAN:    Ms. Martinez is a very pleasant 82 year old woman well known to our service  with history of paroxysmal atrial fibrillation, on Coumadin, sick sinus syndrome, Biotronik dual chamber pacemaker, Non obstructive CAD on cardiac cath 3/16 with historically NL LV function on TTE 3/16,  pulmonary fibrosis, hypertension, who presents with vaginal bleeding in the setting of supra therapeutic INR as well as  generalized weakness, emesis, dizziness x3day. She had a pessary placed approx. 1 week ago by Dr. Preciado after which she developed progressively worsening vaginal bleeding.   She self removed the pessary per UroGyn recs but as she persisted in having vaginal spotting as well as weakness and dizziness, she came to the ER where she was found to be  febrile and tachycardic, found with E coli bacteremia, urinary source    - IV abx per ID -  cultures GNR post 22 hours   -  Biotoronik PPM  Interrogated NL PPM function good battery life   - PAF - with elevated Chads Vasc Score Life Long A/C recommended PT on Coumadin restarted by primary team INR goal 2-3     restarted Cardizem for rate control and BP control   - restart losartan & HCTZ when bp can tolerate     - patient to f/u with Dr Nunes 12/27/17 @ 245 pm
SUBJECTIVE: Still with chills and weakness, denies bleeding    MEDICATIONS  (STANDING):  atorvastatin 10 milliGRAM(s) Oral at bedtime  aztreonam  IVPB 1000 milliGRAM(s) IV Intermittent every 8 hours  fluticasone propionate   110 MICROgram(s) HFA Inhaler 1 Puff(s) Inhalation two times a day  pantoprazole    Tablet 40 milliGRAM(s) Oral before breakfast    LABS:                        9.9    6.54  )-----------( 206      ( 20 Dec 2017 06:00 )             30.8     137  |  104  |  13  ----------------------------<  103<H>  3.7   |  23  |  0.64    Ca    7.5<L>      20 Dec 2017 06:00  Phos  2.4     12-20  Mg     1.3     12-20    TPro  5.6<L>  /  Alb  2.8<L>  /  TBili  0.4  /  DBili  x   /  AST  35<H>  /  ALT  24  /  AlkPhos  61  12-20  PT/INR - ( 20 Dec 2017 06:00 )   PT: 69.1 SEC;   INR: 6.00     PTT - ( 20 Dec 2017 06:00 )  PTT:48.5 SEC  CARDIAC MARKERS ( 19 Dec 2017 10:25 )  x     / < 0.06 ng/mL / 71 u/L / 1.38 ng/mL / x        Culture - Blood (12.19.17 @ 11:29)    Culture - Blood:   ***Blood Panel PCR results on this specimen are available  approximately 3 hours after the Gram stain result***  Gram stain, PCR, and/or culture results may not always  correspond due to difference in methodologies  ------------------------------------------------------------  This PCR assay was performed using [employer].  The  following targets are tested for:  Enterococcus, vancomycin  resistant enterococci, Listeria monocytogenes,  coagulase  negative staphylococci, S. aureus, methicillin resistant S.  aureus, Streptococcus agalactiae (Group B), S. pneumoniae,  S. pyogenes (Group A), Acinetobacter baumannii, Enterobacter  cloacae, E. coli, Klebsiella oxytoca, K. pneumoniae, Proteus  sp., Serratia marcescens, Haemophilus influenzae, Neisseria  meningitidis, Pseudomonas aeruginosa, Candida albicans, C.  glabrata, C. krusei, C. parapsilosis, C. tropicalis and the  KPC resistance gene.    -  Escherichia coli: + DETECT DOUG    Specimen Source: BLOOD    Organism: BLOOD CULTURE PCR    Gram Stain Blood:   ***** CRITICAL RESULT *****  PERSON CALLED / READ-BACK: GWENDOLYN GRIER.ANTOINETTE/Y  DATE / TIME CALLED: 12/20/17 0119  CALLED BY: BETTINA NG^Gram Neg Rods  AFTER: 10 HOURS INCUBATION  BOTTLE: ANAEROBIC BOTTLE    Organism Identification: BLOOD CULTURE PCR    Method Type: PCR    PHYSICAL EXAM:  Vital Signs Last 24 Hrs  T(C): 37.2 (20 Dec 2017 07:32), Max: 38.6 (19 Dec 2017 15:27)  T(F): 99 (20 Dec 2017 07:32), Max: 101.5 (19 Dec 2017 15:27)  HR: 89 (20 Dec 2017 07:02) (73 - 113)  BP: 106/52 (20 Dec 2017 07:02) (92/52 - 138/77)  RR: 18 (20 Dec 2017 05:56) (14 - 18)  SpO2: 94% (20 Dec 2017 05:56) (93% - 100%)    Cardiovascular:  S1S2 RRR, No JVD, 1/6 ERENDIRA   Respiratory: Lungs clear to auscultation, normal effort  Gastrointestinal: Abdomen soft, ND, NT, +BS  Skin: Warm, dry, intact. No rash.  Ext: No C/C/E BL LE    DIAGNOSTIC DATA    < from: Cardiac Cath Lab - Adult (03.25.16 @ 11:03) >  CORONARY VESSELS: The coronary circulation is left dominant.  LM:   --  LM: Normal.  LAD:   --  Proximal LAD: Normal.  --  Mid LAD: Angiography showed minor luminal irregularities with no flow  limiting lesions.  --  Distal LAD: Angiography showed minor luminal irregularities with no  flow limiting lesions.  --  D1: Angiography showed minor luminal irregularities with no flow  limiting lesions.  CX:   --  Circumflex: Normal.  --  OM1: Normal.  RCA:   --  RCA: Normal. Non-dominant RCA.  COMPLICATIONS: There were no complications.  DIAGNOSTIC RECOMMENDATIONS: Medical management and aggressive risk factor  modification is recommended.    TTE: in office 6/17   Conclusions:   1. Normal left ventricular size with normal systolic function.   2. Mild diastolic dysfunction.   3. Normal right ventricular size and function. Device wire in the right heart.   4. Minimal aortic regurgitation.       ASSESSMENT AND PLAN:    Ms. Martinez is a very pleasant 82 year old woman well known to our service  with history of paroxysmal atrial fibrillation, on Coumadin, sick sinus syndrome, Biotronik dual chamber pacemaker, Non obstructive CAD on cardiac cath 3/16 with historically NL LV function on TTE 3/16,  pulmonary fibrosis, hypertension, who presents with vaginal bleeding in the setting of supratherapeutic INR as well as  generalized weakness, emesis, dizziness x3day. She had a pessary placed approx 1 week ago by Dr. Preciado after which she developed progressively worsening vaginal bleeding.   She self removed the pessary per UroGyn recs but as she persisted in having vaginal spotting as well as weakness and dizziness, she came to the ER where she was found to be  febrile and tachycardic with a positive UA .  She denies any anginal CP, SMITH, palpitations, or syncope    - Dizziness is likely 2/2 febrile illness/urosepsis, however, could consider CT head if dizziness persists given supratherapeutic INR though currently patient with no focal deficits  - IV abx per ID - f/u cultures   - Interrogate Biotoronik PPM tomorrow to r/o cardiac etiology of dizziness  - PAF - Coumadin on hold given INR>5--Monitor H/H, Patient also on IVF/denies active bleeding  - patient to f/u with Dr Nunes for cardiology upon D/C    Mary Anne Reynolds PA-C  [facility]  [address], [address]   [place] [address]  office [phone]  pager [phone]
SUBJECTIVE: feeling much better    MEDICATIONS  (STANDING):  atorvastatin 10 milliGRAM(s) Oral at bedtime  aztreonam  IVPB 2000 milliGRAM(s) IV Intermittent every 8 hours  fluticasone propionate   110 MICROgram(s) HFA Inhaler 1 Puff(s) Inhalation two times a day  pantoprazole    Tablet 40 milliGRAM(s) Oral before breakfast    LABS:                        9.4    4.23  )-----------( 172      ( 21 Dec 2017 06:30 )             29.5     136  |  102  |  11  ----------------------------<  106<H>  3.7   |  24  |  0.60    Ca    7.5<L>      21 Dec 2017 06:30  Phos  1.8     12-21  Mg     1.7     12-21    TPro  5.6<L>  /  Alb  2.8<L>  /  TBili  0.4  /  DBili  x   /  AST  35<H>  /  ALT  24  /  AlkPhos  61  12-20    Creatinine Trend: 0.60<--, 0.64<--, 0.80<--   PT/INR - ( 21 Dec 2017 06:30 )   PT: 37.8 SEC;   INR: 3.21     PTT - ( 21 Dec 2017 06:30 )  PTT:39.3 SEC  CARDIAC MARKERS ( 19 Dec 2017 10:25 )  x     / < 0.06 ng/mL / 71 u/L / 1.38 ng/mL / x        PHYSICAL EXAM  Vital Signs Last 24 Hrs  T(C): 37.1 (21 Dec 2017 08:00), Max: 37.1 (21 Dec 2017 08:00)  T(F): 98.7 (21 Dec 2017 08:00), Max: 98.7 (21 Dec 2017 08:00)  HR: 80 (21 Dec 2017 08:00) (80 - 103)  BP: 128/78 (21 Dec 2017 08:00) (110/65 - 128/78)  RR: 18 (21 Dec 2017 08:00) (18 - 20)  SpO2: 98% (21 Dec 2017 08:00) (94% - 100%)    Cardiovascular:  S1S2 RRR, No JVD, 1/6 ERENDIRA   Respiratory: Lungs clear to auscultation, normal effort  Gastrointestinal: Abdomen soft, ND, NT, +BS  Skin: Warm, dry, intact. No rash.  Ext: No C/C/E BL LE    DIAGNOSTIC DATA    < from: Cardiac Cath Lab - Adult (03.25.16 @ 11:03) >  CORONARY VESSELS: The coronary circulation is left dominant.  LM:   --  LM: Normal.  LAD:   --  Proximal LAD: Normal.  --  Mid LAD: Angiography showed minor luminal irregularities with no flow  limiting lesions.  --  Distal LAD: Angiography showed minor luminal irregularities with no  flow limiting lesions.  --  D1: Angiography showed minor luminal irregularities with no flow  limiting lesions.  CX:   --  Circumflex: Normal.  --  OM1: Normal.  RCA:   --  RCA: Normal. Non-dominant RCA.  COMPLICATIONS: There were no complications.  DIAGNOSTIC RECOMMENDATIONS: Medical management and aggressive risk factor  modification is recommended.    TTE: in office 6/17   Conclusions:   1. Normal left ventricular size with normal systolic function.   2. Mild diastolic dysfunction.   3. Normal right ventricular size and function. Device wire in the right heart.   4. Minimal aortic regurgitation.       ASSESSMENT AND PLAN:    Ms. Martinez is a very pleasant 82 year old woman well known to our service  with history of paroxysmal atrial fibrillation, on Coumadin, sick sinus syndrome, Biotronik dual chamber pacemaker, Non obstructive CAD on cardiac cath 3/16 with historically NL LV function on TTE 3/16,  pulmonary fibrosis, hypertension, who presents with vaginal bleeding in the setting of supratherapeutic INR as well as  generalized weakness, emesis, dizziness x3day. She had a pessary placed approx 1 week ago by Dr. Preciado after which she developed progressively worsening vaginal bleeding.   She self removed the pessary per UroGyn recs but as she persisted in having vaginal spotting as well as weakness and dizziness, she came to the ER where she was found to be  febrile and tachycardic, found with E coli bacteremia, urinary source    - IV abx per ID - f/u cultures   - Interrogate Biotoronik PPM  to r/o cardiac etiology of dizziness  - PAF - Coumadin on hold given INR>5--Monitor H/H, Patient also on IVF/denies active bleeding, resume when stable  - patient to f/u with Dr Nunes for cardiology upon D/C    Mary Anne Reynolds PA-C  [facility]  [address], [address]   [place] [address]  office [phone]  pager [phone]
SUBJECTIVE: feeling much better no chest pain     MEDICATIONS  (STANDING):  atorvastatin 10 milliGRAM(s) Oral at bedtime  cefTRIAXone   IVPB 2 Gram(s) IV Intermittent every 24 hours  cefTRIAXone   IVPB      diltiazem    milliGRAM(s) Oral daily  fluticasone propionate   110 MICROgram(s) HFA Inhaler 1 Puff(s) Inhalation two times a day  furosemide    Tablet 20 milliGRAM(s) Oral once  pantoprazole    Tablet 40 milliGRAM(s) Oral before breakfast    MEDICATIONS  (PRN):  acetaminophen   Tablet 650 milliGRAM(s) Oral every 6 hours PRN For Temp greater than 38 C (100.4 F)  acetaminophen   Tablet. 650 milliGRAM(s) Oral every 6 hours PRN mild and moderate pain (1-6)  ondansetron Injectable 4 milliGRAM(s) IV Push every 6 hours PRN Nausea and/or Vomiting      LABS:                        9.5    3.73  )-----------( 195      ( 22 Dec 2017 06:00 )             29.4     Hemoglobin: 9.5 g/dL (12-22 @ 06:00)  Hemoglobin: 9.5 g/dL (12-22 @ 06:00)  Hemoglobin: 9.4 g/dL (12-21 @ 06:30)  Hemoglobin: 10.4 g/dL (12-20 @ 21:32)  Hemoglobin: 9.9 g/dL (12-20 @ 14:55)    12-22    139  |  105  |  8   ----------------------------<  97  3.4<L>   |  22  |  0.56    Ca    7.8<L>      22 Dec 2017 06:00  Phos  3.2     12-22  Mg     1.6     12-22    TPro  5.7<L>  /  Alb  2.8<L>  /  TBili  0.3  /  DBili  x   /  AST  31  /  ALT  20  /  AlkPhos  69  12-22    Creatinine Trend: 0.56<--, 0.60<--, 0.64<--, 0.80<--    PHYSICAL EXAM  Vital Signs Last 24 Hrs  T(C): 37.6 (23 Dec 2017 06:10), Max: 37.6 (23 Dec 2017 06:10)  T(F): 99.7 (23 Dec 2017 06:10), Max: 99.7 (23 Dec 2017 06:10)  HR: 89 (23 Dec 2017 06:10) (78 - 94)  BP: 146/82 (23 Dec 2017 06:10) (138/86 - 172/82)  BP(mean): --  RR: 18 (23 Dec 2017 06:10) (18 - 20)  SpO2: 95% (23 Dec 2017 06:10) (94% - 98%)      Cardiovascular:  S1S2 RRR, No JVD, 1/6 ERENDIRA   Respiratory: Lungs clear to auscultation, normal effort  Gastrointestinal: Abdomen soft, ND, NT, +BS  Ext: No C/C/E BL LE    DIAGNOSTIC DATA    < from: Cardiac Cath Lab - Adult (03.25.16 @ 11:03) >  CORONARY VESSELS: The coronary circulation is left dominant.  LM:   --  LM: Normal.  LAD:   --  Proximal LAD: Normal.  --  Mid LAD: Angiography showed minor luminal irregularities with no flow  limiting lesions.  --  Distal LAD: Angiography showed minor luminal irregularities with no  flow limiting lesions.  --  D1: Angiography showed minor luminal irregularities with no flow  limiting lesions.  CX:   --  Circumflex: Normal.  --  OM1: Normal.  RCA:   --  RCA: Normal. Non-dominant RCA.  COMPLICATIONS: There were no complications.  DIAGNOSTIC RECOMMENDATIONS: Medical management and aggressive risk factor  modification is recommended.    TTE: in office 6/17   Conclusions:   1. Normal left ventricular size with normal systolic function.   2. Mild diastolic dysfunction.   3. Normal right ventricular size and function. Device wire in the right heart.   4. Minimal aortic regurgitation.     DEVICE INTERROGATION    Impression/Plan:          Normal PPM function.       Normal sensing and pacing via iterative testing. Good battery status. Excellent threshold capture.  No reprogramming.    ASSESSMENT AND PLAN:    Ms. Martinez is a very pleasant 82 year old woman well known to our service  with history of paroxysmal atrial fibrillation, on Coumadin, sick sinus syndrome, Biotronik dual chamber pacemaker, Non obstructive CAD on cardiac cath 3/16 with historically NL LV function on TTE 3/16,  pulmonary fibrosis, hypertension, who presents with vaginal bleeding in the setting of supra therapeutic INR as well as  generalized weakness, emesis, dizziness x3day. She had a pessary placed approx. 1 week ago by Dr. Preciado after which she developed progressively worsening vaginal bleeding.   She self removed the pessary per UroGyn recs but as she persisted in having vaginal spotting as well as weakness and dizziness, she came to the ER where she was found to be  febrile and tachycardic, found with E coli bacteremia, urinary source    - IV abx per ID -  cultures GNR post 22 hours   -  Biotoronik PPM  Interrogated NL PPM function good battery life   - PAF - with elevated Chads Vasc Score Life Long A/C recommended PT on Coumadin restarted by primary team INR goal 2-3     restarted Cardizem for rate control and BP control   - cont to hold losartan & HCTZ in setting of sepsis  for now   -hypokalemic     K supplemented by primary team      - patient to f/u with Dr Nunes for cardiology upon D/C
chief complaint : fever , chills, dysuria        SUBJECTIVE / OVERNIGHT EVENTS: pt says she feels better today      MEDICATIONS  (STANDING):  atorvastatin 10 milliGRAM(s) Oral at bedtime  aztreonam  IVPB 2000 milliGRAM(s) IV Intermittent every 8 hours  fluticasone propionate   110 MICROgram(s) HFA Inhaler 1 Puff(s) Inhalation two times a day  pantoprazole    Tablet 40 milliGRAM(s) Oral before breakfast    MEDICATIONS  (PRN):  acetaminophen   Tablet 650 milliGRAM(s) Oral every 6 hours PRN For Temp greater than 38 C (100.4 F)  acetaminophen   Tablet. 650 milliGRAM(s) Oral every 6 hours PRN mild and moderate pain (1-6)  ondansetron Injectable 4 milliGRAM(s) IV Push every 6 hours PRN Nausea and/or Vomiting        CAPILLARY BLOOD GLUCOSE    Vital Signs Last 24 Hrs  T(C): 36.7 (20 Dec 2017 19:03), Max: 37.2 (20 Dec 2017 07:32)  T(F): 98.1 (20 Dec 2017 19:03), Max: 99 (20 Dec 2017 07:32)  HR: 84 (20 Dec 2017 19:03) (80 - 103)  BP: 119/71 (20 Dec 2017 19:03) (92/52 - 126/54)  BP(mean): --  RR: 18 (20 Dec 2017 19:03) (18 - 20)  SpO2: 95% (20 Dec 2017 19:03) (94% - 100%)    I&O's Summary      Constitutional: No fever, fatigue  Skin: No rash.  Eyes: No recent vision problems or eye pain.  ENT: No congestion, ear pain, or sore throat.  Cardiovascular: No chest pain or palpation.  Respiratory: No cough, shortness of breath, congestion, or wheezing.  Gastrointestinal: No abdominal pain, nausea, vomiting, or diarrhea.  Genitourinary: No dysuria.  Musculoskeletal: No joint swelling.  Neurologic: No headache.    PHYSICAL EXAM:  GENERAL: NAD  EYES: EOMI, PERRLA  NECK: Supple, No JVD  CHEST/LUNG: dec breath sounds at bases   HEART:  S1 , S2 +  ABDOMEN: soft, bs+  EXTREMITIES:  trace edema  NEUROLOGY: alert awake oriented       LABS:                        10.4   4.69  )-----------( 189      ( 20 Dec 2017 21:32 )             32.4     12-20    137  |  104  |  13  ----------------------------<  103<H>  3.7   |  23  |  0.64    Ca    7.5<L>      20 Dec 2017 06:00  Phos  2.4     12-20  Mg     1.3     12-20    TPro  5.6<L>  /  Alb  2.8<L>  /  TBili  0.4  /  DBili  x   /  AST  35<H>  /  ALT  24  /  AlkPhos  61  12-20    PT/INR - ( 20 Dec 2017 06:00 )   PT: 69.1 SEC;   INR: 6.00          PTT - ( 20 Dec 2017 06:00 )  PTT:48.5 SEC  CARDIAC MARKERS ( 19 Dec 2017 10:25 )  x     / < 0.06 ng/mL / 71 u/L / 1.38 ng/mL / x          Urinalysis Basic - ( 19 Dec 2017 09:53 )    Color: YELLOW / Appearance: HAZY / S.014 / pH: 6.5  Gluc: NEGATIVE / Ketone: NEGATIVE  / Bili: NEGATIVE / Urobili: NORMAL mg/dL   Blood: SMALL / Protein: 20 mg/dL / Nitrite: POSITIVE   Leuk Esterase: LARGE / RBC: 5-10 / WBC >50   Sq Epi: x / Non Sq Epi: x / Bacteria: MOD        RADIOLOGY & ADDITIONAL TESTS:    Imaging Personally Reviewed:    Consultant(s) Notes Reviewed:      Care Discussed with Consultants/Other Providers:
chief complaint : fever , chills, dysuria        SUBJECTIVE / OVERNIGHT EVENTS: pt says she feels better today    MEDICATIONS  (STANDING):  atorvastatin 10 milliGRAM(s) Oral at bedtime  cefTRIAXone   IVPB      diltiazem    milliGRAM(s) Oral daily  fluticasone propionate   110 MICROgram(s) HFA Inhaler 1 Puff(s) Inhalation two times a day  furosemide    Tablet 20 milliGRAM(s) Oral once  pantoprazole    Tablet 40 milliGRAM(s) Oral before breakfast    MEDICATIONS  (PRN):  acetaminophen   Tablet 650 milliGRAM(s) Oral every 6 hours PRN For Temp greater than 38 C (100.4 F)  acetaminophen   Tablet. 650 milliGRAM(s) Oral every 6 hours PRN mild and moderate pain (1-6)  ondansetron Injectable 4 milliGRAM(s) IV Push every 6 hours PRN Nausea and/or Vomiting      Vital Signs Last 24 Hrs  T(C): 36.9 (22 Dec 2017 17:57), Max: 37.2 (21 Dec 2017 21:23)  T(F): 98.5 (22 Dec 2017 17:57), Max: 99 (21 Dec 2017 21:23)  HR: 87 (22 Dec 2017 17:57) (83 - 94)  BP: 172/82 (22 Dec 2017 17:57) (137/74 - 172/82)  BP(mean): --  RR: 20 (22 Dec 2017 17:57) (18 - 20)  SpO2: 98% (22 Dec 2017 17:57) (95% - 98%)    Constitutional: No fever, fatigue  Skin: No rash.  Eyes: No recent vision problems or eye pain.  ENT: No congestion, ear pain, or sore throat.  Cardiovascular: No chest pain or palpation.  Respiratory: No cough, shortness of breath, congestion, or wheezing.  Gastrointestinal: No abdominal pain, nausea, vomiting, or diarrhea.  Genitourinary: No dysuria.  Musculoskeletal: No joint swelling.  Neurologic: No headache.    PHYSICAL EXAM:  GENERAL: NAD  EYES: EOMI, PERRLA  NECK: Supple, No JVD  CHEST/LUNG: dec breath sounds at bases   HEART:  S1 , S2 +  ABDOMEN: soft, bs+  EXTREMITIES:  trace edema  NEUROLOGY: alert awake oriented     LABS:      139  |  105  |  8   ----------------------------<  97  3.4<L>   |  22  |  0.56    Ca    7.8<L>      22 Dec 2017 06:00  Phos  3.2       Mg     1.6         TPro  5.7<L>  /  Alb  2.8<L>  /  TBili  0.3  /  DBili      /  AST  31  /  ALT  20  /  AlkPhos  69  12    Creatinine Trend: 0.56 <--, 0.60 <--, 0.64 <--, 0.80 <--                        9.5    3.73  )-----------( 195      ( 22 Dec 2017 06:00 )             29.4     Urine Studies:  Urinalysis Basic - ( 19 Dec 2017 09:53 )    Color: YELLOW / Appearance: HAZY / S.014 / pH: 6.5  Gluc: NEGATIVE / Ketone: NEGATIVE  / Bili: NEGATIVE / Urobili: NORMAL mg/dL   Blood: SMALL / Protein: 20 mg/dL / Nitrite: POSITIVE   Leuk Esterase: LARGE / RBC: 5-10 / WBC >50   Sq Epi:  / Non Sq Epi:  / Bacteria: MOD              LIVER FUNCTIONS - ( 22 Dec 2017 06:00 )  Alb: 2.8 g/dL / Pro: 5.7 g/dL / ALK PHOS: 69 u/L / ALT: 20 u/L / AST: 31 u/L / GGT: x           PT/INR - ( 22 Dec 2017 06:00 )   PT: 22.8 SEC;   INR: 2.02          PTT - ( 22 Dec 2017 06:00 )  PTT:38.5 SEC
chief complaint : fever , chills, dysuria        SUBJECTIVE / OVERNIGHT EVENTS: pt says she feels better today    MEDICATIONS  (STANDING):  atorvastatin 10 milliGRAM(s) Oral at bedtime  cefTRIAXone   IVPB 2 Gram(s) IV Intermittent every 24 hours  cefTRIAXone   IVPB      diltiazem    milliGRAM(s) Oral daily  fluticasone propionate   110 MICROgram(s) HFA Inhaler 1 Puff(s) Inhalation two times a day  furosemide    Tablet 20 milliGRAM(s) Oral once  pantoprazole    Tablet 40 milliGRAM(s) Oral before breakfast    MEDICATIONS  (PRN):  acetaminophen   Tablet 650 milliGRAM(s) Oral every 6 hours PRN For Temp greater than 38 C (100.4 F)  acetaminophen   Tablet. 650 milliGRAM(s) Oral every 6 hours PRN mild and moderate pain (1-6)  ondansetron Injectable 4 milliGRAM(s) IV Push every 6 hours PRN Nausea and/or Vomiting    Vital Signs Last 24 Hrs  T(C): 37.6 (23 Dec 2017 06:10), Max: 37.6 (23 Dec 2017 06:10)  T(F): 99.7 (23 Dec 2017 06:10), Max: 99.7 (23 Dec 2017 06:10)  HR: 89 (23 Dec 2017 06:10) (78 - 94)  BP: 146/82 (23 Dec 2017 06:10) (138/86 - 172/82)  BP(mean): --  RR: 18 (23 Dec 2017 06:10) (18 - 20)  SpO2: 95% (23 Dec 2017 06:10) (94% - 98%)      Constitutional: No fever, fatigue  Skin: No rash.  Eyes: No recent vision problems or eye pain.  ENT: No congestion, ear pain, or sore throat.  Cardiovascular: No chest pain or palpation.  Respiratory: No cough, shortness of breath, congestion, or wheezing.  Gastrointestinal: No abdominal pain, nausea, vomiting, or diarrhea.  Genitourinary: No dysuria.  Musculoskeletal: No joint swelling.  Neurologic: No headache.    PHYSICAL EXAM:  GENERAL: NAD  EYES: EOMI, PERRLA  NECK: Supple, No JVD  CHEST/LUNG: dec breath sounds at bases   HEART:  S1 , S2 +  ABDOMEN: soft, bs+  EXTREMITIES:  trace edema  NEUROLOGY: alert awake oriented     LABS:      141  |  105  |  7   ----------------------------<  112<H>  3.7   |  23  |  0.61    Ca    8.0<L>      23 Dec 2017 11:12  Phos  3.2       Mg     1.6         TPro  6.3  /  Alb  3.1<L>  /  TBili  0.2  /  DBili      /  AST  26  /  ALT  20  /  AlkPhos  78      Creatinine Trend: 0.61 <--, 0.56 <--, 0.60 <--, 0.64 <--, 0.80 <--                        10.3   5.50  )-----------( 259      ( 23 Dec 2017 11:12 )             32.2     Urine Studies:  Urinalysis Basic - ( 19 Dec 2017 09:53 )    Color: YELLOW / Appearance: HAZY / S.014 / pH: 6.5  Gluc: NEGATIVE / Ketone: NEGATIVE  / Bili: NEGATIVE / Urobili: NORMAL mg/dL   Blood: SMALL / Protein: 20 mg/dL / Nitrite: POSITIVE   Leuk Esterase: LARGE / RBC: 5-10 / WBC >50   Sq Epi:  / Non Sq Epi:  / Bacteria: MOD              LIVER FUNCTIONS - ( 23 Dec 2017 11:12 )  Alb: 3.1 g/dL / Pro: 6.3 g/dL / ALK PHOS: 78 u/L / ALT: 20 u/L / AST: 26 u/L / GGT: x           PT/INR - ( 23 Dec 2017 11:12 )   PT: 22.3 SEC;   INR: 1.98          PTT - ( 22 Dec 2017 06:00 )  PTT:38.5 SEC
chief complaint : fever , chills, dysuria        SUBJECTIVE / OVERNIGHT EVENTS: pt says she feels better today    MEDICATIONS  (STANDING):  atorvastatin 10 milliGRAM(s) Oral at bedtime  aztreonam  IVPB 2000 milliGRAM(s) IV Intermittent every 8 hours  fluticasone propionate   110 MICROgram(s) HFA Inhaler 1 Puff(s) Inhalation two times a day  furosemide    Tablet 20 milliGRAM(s) Oral once  pantoprazole    Tablet 40 milliGRAM(s) Oral before breakfast    MEDICATIONS  (PRN):  acetaminophen   Tablet 650 milliGRAM(s) Oral every 6 hours PRN For Temp greater than 38 C (100.4 F)  acetaminophen   Tablet. 650 milliGRAM(s) Oral every 6 hours PRN mild and moderate pain (1-6)  ondansetron Injectable 4 milliGRAM(s) IV Push every 6 hours PRN Nausea and/or Vomiting      Vital Signs Last 24 Hrs  T(C): 37.2 (21 Dec 2017 21:23), Max: 37.2 (21 Dec 2017 21:23)  T(F): 99 (21 Dec 2017 21:23), Max: 99 (21 Dec 2017 21:23)  HR: 86 (21 Dec 2017 21:23) (80 - 86)  BP: 138/79 (21 Dec 2017 21:23) (124/74 - 138/79)  BP(mean): --  RR: 18 (21 Dec 2017 21:23) (18 - 18)  SpO2: 95% (21 Dec 2017 21:23) (94% - 98%)    Constitutional: No fever, fatigue  Skin: No rash.  Eyes: No recent vision problems or eye pain.  ENT: No congestion, ear pain, or sore throat.  Cardiovascular: No chest pain or palpation.  Respiratory: No cough, shortness of breath, congestion, or wheezing.  Gastrointestinal: No abdominal pain, nausea, vomiting, or diarrhea.  Genitourinary: No dysuria.  Musculoskeletal: No joint swelling.  Neurologic: No headache.    PHYSICAL EXAM:  GENERAL: NAD  EYES: EOMI, PERRLA  NECK: Supple, No JVD  CHEST/LUNG: dec breath sounds at bases   HEART:  S1 , S2 +  ABDOMEN: soft, bs+  EXTREMITIES:  trace edema  NEUROLOGY: alert awake oriented     LABS:      136  |  102  |  11  ----------------------------<  106<H>  3.7   |  24  |  0.60    Ca    7.5<L>      21 Dec 2017 06:30  Phos  1.8     12-  Mg     1.7     12-    TPro  5.6<L>  /  Alb  2.8<L>  /  TBili  0.4  /  DBili      /  AST  35<H>  /  ALT  24  /  AlkPhos  61  12-20    Creatinine Trend: 0.60 <--, 0.64 <--, 0.80 <--                        9.4    4.23  )-----------( 172      ( 21 Dec 2017 06:30 )             29.5     Urine Studies:  Urinalysis Basic - ( 19 Dec 2017 09:53 )    Color: YELLOW / Appearance: HAZY / S.014 / pH: 6.5  Gluc: NEGATIVE / Ketone: NEGATIVE  / Bili: NEGATIVE / Urobili: NORMAL mg/dL   Blood: SMALL / Protein: 20 mg/dL / Nitrite: POSITIVE   Leuk Esterase: LARGE / RBC: 5-10 / WBC >50   Sq Epi:  / Non Sq Epi:  / Bacteria: MOD              LIVER FUNCTIONS - ( 20 Dec 2017 06:00 )  Alb: 2.8 g/dL / Pro: 5.6 g/dL / ALK PHOS: 61 u/L / ALT: 24 u/L / AST: 35 u/L / GGT: x           PT/INR - ( 21 Dec 2017 06:30 )   PT: 37.8 SEC;   INR: 3.21          PTT - ( 21 Dec 2017 06:30 )  PTT:39.3 SEC

## 2017-12-27 LAB
BACTERIA BLD CULT: SIGNIFICANT CHANGE UP
BACTERIA BLD CULT: SIGNIFICANT CHANGE UP

## 2018-01-11 ENCOUNTER — APPOINTMENT (OUTPATIENT)
Dept: UROGYNECOLOGY | Facility: CLINIC | Age: 83
End: 2018-01-11
Payer: MEDICARE

## 2018-01-11 PROCEDURE — 99213 OFFICE O/P EST LOW 20 MIN: CPT

## 2018-01-11 PROCEDURE — A4562: CPT

## 2018-01-26 ENCOUNTER — APPOINTMENT (OUTPATIENT)
Dept: UROGYNECOLOGY | Facility: CLINIC | Age: 83
End: 2018-01-26
Payer: MEDICARE

## 2018-01-26 PROCEDURE — 99213 OFFICE O/P EST LOW 20 MIN: CPT

## 2018-01-26 RX ORDER — POTASSIUM BICARB/MAG COMBO 21 500-300 MG
POWDER EFFERVESCENT (GRAM) ORAL
Qty: 10 | Refills: 0 | Status: DISCONTINUED | COMMUNITY
Start: 2017-12-24

## 2018-01-26 RX ORDER — LOSARTAN POTASSIUM 25 MG/1
25 TABLET, FILM COATED ORAL
Qty: 90 | Refills: 0 | Status: DISCONTINUED | COMMUNITY
Start: 2017-11-14

## 2018-01-26 RX ORDER — DICLOFENAC SODIUM 10 MG/G
1 GEL TOPICAL
Qty: 100 | Refills: 0 | Status: DISCONTINUED | COMMUNITY
Start: 2017-10-28

## 2018-01-26 RX ORDER — CEFPODOXIME PROXETIL 200 MG/1
200 TABLET, FILM COATED ORAL
Qty: 24 | Refills: 0 | Status: DISCONTINUED | COMMUNITY
Start: 2017-12-24

## 2018-03-04 ENCOUNTER — EMERGENCY (EMERGENCY)
Facility: HOSPITAL | Age: 83
LOS: 1 days | Discharge: HOME | End: 2018-03-04
Attending: EMERGENCY MEDICINE | Admitting: EMERGENCY MEDICINE
Payer: MEDICARE

## 2018-03-04 VITALS
RESPIRATION RATE: 20 BRPM | DIASTOLIC BLOOD PRESSURE: 88 MMHG | HEART RATE: 90 BPM | SYSTOLIC BLOOD PRESSURE: 163 MMHG | TEMPERATURE: 98 F | OXYGEN SATURATION: 100 %

## 2018-03-04 DIAGNOSIS — Z87.81 PERSONAL HISTORY OF (HEALED) TRAUMATIC FRACTURE: Chronic | ICD-10-CM

## 2018-03-04 DIAGNOSIS — Z95.0 PRESENCE OF CARDIAC PACEMAKER: Chronic | ICD-10-CM

## 2018-03-04 LAB
ALBUMIN SERPL ELPH-MCNC: 3.9 G/DL — SIGNIFICANT CHANGE UP (ref 3.3–5)
ALP SERPL-CCNC: 78 U/L — SIGNIFICANT CHANGE UP (ref 40–120)
ALT FLD-CCNC: 11 U/L — SIGNIFICANT CHANGE UP (ref 4–33)
APTT BLD: 34 SEC — SIGNIFICANT CHANGE UP (ref 27.5–37.4)
AST SERPL-CCNC: 18 U/L — SIGNIFICANT CHANGE UP (ref 4–32)
BASOPHILS # BLD AUTO: 0.02 K/UL — SIGNIFICANT CHANGE UP (ref 0–0.2)
BASOPHILS NFR BLD AUTO: 0.3 % — SIGNIFICANT CHANGE UP (ref 0–2)
BILIRUB SERPL-MCNC: 0.2 MG/DL — SIGNIFICANT CHANGE UP (ref 0.2–1.2)
BUN SERPL-MCNC: 13 MG/DL — SIGNIFICANT CHANGE UP (ref 7–23)
CALCIUM SERPL-MCNC: 9.2 MG/DL — SIGNIFICANT CHANGE UP (ref 8.4–10.5)
CHLORIDE SERPL-SCNC: 99 MMOL/L — SIGNIFICANT CHANGE UP (ref 98–107)
CK MB BLD-MCNC: 1.64 NG/ML — SIGNIFICANT CHANGE UP (ref 1–4.7)
CK MB BLD-MCNC: 1.93 NG/ML — SIGNIFICANT CHANGE UP (ref 1–4.7)
CK MB BLD-MCNC: SIGNIFICANT CHANGE UP (ref 0–2.5)
CK MB BLD-MCNC: SIGNIFICANT CHANGE UP (ref 0–2.5)
CK SERPL-CCNC: 48 U/L — SIGNIFICANT CHANGE UP (ref 25–170)
CK SERPL-CCNC: 52 U/L — SIGNIFICANT CHANGE UP (ref 25–170)
CO2 SERPL-SCNC: 26 MMOL/L — SIGNIFICANT CHANGE UP (ref 22–31)
CREAT SERPL-MCNC: 0.78 MG/DL — SIGNIFICANT CHANGE UP (ref 0.5–1.3)
EOSINOPHIL # BLD AUTO: 0.11 K/UL — SIGNIFICANT CHANGE UP (ref 0–0.5)
EOSINOPHIL NFR BLD AUTO: 1.5 % — SIGNIFICANT CHANGE UP (ref 0–6)
GLUCOSE SERPL-MCNC: 83 MG/DL — SIGNIFICANT CHANGE UP (ref 70–99)
HCT VFR BLD CALC: 40.1 % — SIGNIFICANT CHANGE UP (ref 34.5–45)
HGB BLD-MCNC: 12.5 G/DL — SIGNIFICANT CHANGE UP (ref 11.5–15.5)
IMM GRANULOCYTES # BLD AUTO: 0.03 # — SIGNIFICANT CHANGE UP
IMM GRANULOCYTES NFR BLD AUTO: 0.4 % — SIGNIFICANT CHANGE UP (ref 0–1.5)
INR BLD: 2.12 — HIGH (ref 0.88–1.17)
LYMPHOCYTES # BLD AUTO: 1.15 K/UL — SIGNIFICANT CHANGE UP (ref 1–3.3)
LYMPHOCYTES # BLD AUTO: 15.6 % — SIGNIFICANT CHANGE UP (ref 13–44)
MCHC RBC-ENTMCNC: 27.2 PG — SIGNIFICANT CHANGE UP (ref 27–34)
MCHC RBC-ENTMCNC: 31.2 % — LOW (ref 32–36)
MCV RBC AUTO: 87.4 FL — SIGNIFICANT CHANGE UP (ref 80–100)
MONOCYTES # BLD AUTO: 0.57 K/UL — SIGNIFICANT CHANGE UP (ref 0–0.9)
MONOCYTES NFR BLD AUTO: 7.8 % — SIGNIFICANT CHANGE UP (ref 2–14)
NEUTROPHILS # BLD AUTO: 5.47 K/UL — SIGNIFICANT CHANGE UP (ref 1.8–7.4)
NEUTROPHILS NFR BLD AUTO: 74.4 % — SIGNIFICANT CHANGE UP (ref 43–77)
NRBC # FLD: 0 — SIGNIFICANT CHANGE UP
PLATELET # BLD AUTO: 360 K/UL — SIGNIFICANT CHANGE UP (ref 150–400)
PMV BLD: 9.3 FL — SIGNIFICANT CHANGE UP (ref 7–13)
POTASSIUM SERPL-MCNC: 4.1 MMOL/L — SIGNIFICANT CHANGE UP (ref 3.5–5.3)
POTASSIUM SERPL-SCNC: 4.1 MMOL/L — SIGNIFICANT CHANGE UP (ref 3.5–5.3)
PROT SERPL-MCNC: 7.2 G/DL — SIGNIFICANT CHANGE UP (ref 6–8.3)
PROTHROM AB SERPL-ACNC: 24.7 SEC — HIGH (ref 9.8–13.1)
RBC # BLD: 4.59 M/UL — SIGNIFICANT CHANGE UP (ref 3.8–5.2)
RBC # FLD: 13.7 % — SIGNIFICANT CHANGE UP (ref 10.3–14.5)
SODIUM SERPL-SCNC: 140 MMOL/L — SIGNIFICANT CHANGE UP (ref 135–145)
TROPONIN T SERPL-MCNC: < 0.06 NG/ML — SIGNIFICANT CHANGE UP (ref 0–0.06)
TROPONIN T SERPL-MCNC: < 0.06 NG/ML — SIGNIFICANT CHANGE UP (ref 0–0.06)
WBC # BLD: 7.35 K/UL — SIGNIFICANT CHANGE UP (ref 3.8–10.5)
WBC # FLD AUTO: 7.35 K/UL — SIGNIFICANT CHANGE UP (ref 3.8–10.5)

## 2018-03-04 PROCEDURE — 71045 X-RAY EXAM CHEST 1 VIEW: CPT | Mod: 26

## 2018-03-04 PROCEDURE — 99219: CPT | Mod: GC

## 2018-03-04 PROCEDURE — 93010 ELECTROCARDIOGRAM REPORT: CPT | Mod: 59,GC

## 2018-03-04 RX ORDER — WARFARIN SODIUM 2.5 MG/1
5 TABLET ORAL ONCE
Qty: 0 | Refills: 0 | Status: COMPLETED | OUTPATIENT
Start: 2018-03-04 | End: 2018-03-04

## 2018-03-04 RX ORDER — HYDRALAZINE HCL 50 MG
25 TABLET ORAL ONCE
Qty: 0 | Refills: 0 | Status: COMPLETED | OUTPATIENT
Start: 2018-03-04 | End: 2018-03-04

## 2018-03-04 RX ORDER — LOSARTAN POTASSIUM 100 MG/1
25 TABLET, FILM COATED ORAL DAILY
Qty: 0 | Refills: 0 | Status: DISCONTINUED | OUTPATIENT
Start: 2018-03-04 | End: 2018-03-08

## 2018-03-04 RX ORDER — ATORVASTATIN CALCIUM 80 MG/1
10 TABLET, FILM COATED ORAL AT BEDTIME
Qty: 0 | Refills: 0 | Status: DISCONTINUED | OUTPATIENT
Start: 2018-03-04 | End: 2018-03-08

## 2018-03-04 RX ORDER — SODIUM CHLORIDE 9 MG/ML
500 INJECTION INTRAMUSCULAR; INTRAVENOUS; SUBCUTANEOUS ONCE
Qty: 0 | Refills: 0 | Status: COMPLETED | OUTPATIENT
Start: 2018-03-04 | End: 2018-03-04

## 2018-03-04 RX ADMIN — SODIUM CHLORIDE 500 MILLILITER(S): 9 INJECTION INTRAMUSCULAR; INTRAVENOUS; SUBCUTANEOUS at 20:00

## 2018-03-04 RX ADMIN — Medication 25 MILLIGRAM(S): at 17:30

## 2018-03-04 RX ADMIN — WARFARIN SODIUM 5 MILLIGRAM(S): 2.5 TABLET ORAL at 23:46

## 2018-03-04 RX ADMIN — ATORVASTATIN CALCIUM 10 MILLIGRAM(S): 80 TABLET, FILM COATED ORAL at 23:47

## 2018-03-04 NOTE — ED PROVIDER NOTE - ATTENDING CONTRIBUTION TO CARE
geovani: pt is vague and often nondescript about sx.   Main complaint is pounding in chest for 2 days and chest pressure on an off since yesterday. Pressure lasts up to several hours. Unclear if this is similar to past episodes of chest discomfort. Had cardiac cath in 2016 for which stenting not needed and medical management was recommended.    Pt also feels weak and liteheaded and feels that something is wrong but cant further articulate.   exam: unremarkable.  Labs as noted.   Will either admit or place in CDU for stress thallium.   Aware that pts  is in ED. He is unable to care for self and he may need to be admitted as social issue of pt is admitted.

## 2018-03-04 NOTE — ED PROVIDER NOTE - PROGRESS NOTE DETAILS
Forrest: Spoke to [facility] cardiology INDRA Carlson who agree with plan to observe patient in CDU for enzymes and stress test, will f/u results. Spoke with CDU, ok for admission

## 2018-03-04 NOTE — ED ADULT NURSE REASSESSMENT NOTE - NS ED NURSE REASSESS COMMENT FT1
pt resting, c/o lightheadedness, worse with standing and with ambulation. pt assisted to bathroom without any difficulty. denies any additional symptoms. nad noted, safety maintained. will continue to observe. pending disposition
received report on pt from ANTOINETTE Shelley at 12:30pm. pt presents awake a&ox4, endorses dizziness, denies ha, visual disturbances. speech clear, no neuro deficits noted. skin warm, dry, appropriate for race. respirations even, unlabored. denies cp or sob. denies n/v/d. denies fever or chills. ivl site clean, dry, intact, patent. cardiac monitor in place in nsr. safety maintained. will re-assess.

## 2018-03-04 NOTE — ED ADULT NURSE NOTE - OBJECTIVE STATEMENT
Receive pt in spot 27 alert and oriented x 3 c/o palpitation, dizziness, weakness  and sob.  Denies chest pain.  IV placed,. Labs sent .  Pt afib on cardiac monitoring

## 2018-03-04 NOTE — ED ADULT TRIAGE NOTE - CHIEF COMPLAINT QUOTE
Pt co palpitations since yesterday with weakness ans sob, Pt states started on prednisone this past Thursday.

## 2018-03-04 NOTE — ED PROVIDER NOTE - OBJECTIVE STATEMENT
82F with h/o pAF on Coumadin, sick sinus syndrome, Biotronik dual chamber pacemaker, non-obstructive CAD on cardiac cath 3/16 with NL LV function on TTE 3/16,  pulmonary fibrosis, hypertension presenting to ED for evaluation of 1 day of "heavy heart beat" and dizziness. Patient states that it started last night while doing her taxes. She began to feel her heart pumping heavily in her chest. Denies that it felt similar to episodes of AF in the past, did not feel rapid. It was associated with a discomfort in the substernal region, which she describes as a pressure/squeeze. Denies radiation of the chest discomfort. Denies associated diaphoresis, nausea, vomiting.

## 2018-03-04 NOTE — ED PROVIDER NOTE - MEDICAL DECISION MAKING DETAILS
82F with h/o pAF on Coumadin, sick sinus syndrome, Biotronik dual chamber pacemaker, non-obstructive CAD on cardiac cath 3/16 with NL LV function on TTE 3/16,  pulmonary fibrosis, hypertension presenting to ED for evaluation of 1 day of "heavy heart beat" and dizziness. DDx includes AF episode vs other cardiac etiology vs other. Will check routine labs including cardiac enzymes, CXR and reassess.

## 2018-03-04 NOTE — ED CDU PROVIDER INITIAL DAY NOTE - ATTENDING CONTRIBUTION TO CARE
82F p/w chest pressure x 1 day, described as pounding, intermittent, a/w lightheadedness.  H/o cardiac cath with nonobstructive CAD.  Seen in ED and EKG nonischemic, CE wnl.  ED d/w cards [facility] [facility] and OK for CDU with [facility] tomorrow.  Tele monitor, serial CE, reassess.   VS:  unremarkable    GEN - NAD; well appearing; A+O x3   HEAD - NC/AT     ENT - PEERL, EOMI, mucous membranes  moist , no discharge      NECK: Neck supple, non-tender without lymphadenopathy, no masses, no JVD  PULM - CTA b/l,  symmetric breath sounds  COR -  normal heart sounds    ABD - , ND, NT, soft, no guarding, no rebound, no masses    BACK - no CVA tenderness, nontender spine     EXTREMS - no edema, no deformity, warm and well perfused    SKIN - no rash or bruising      NEUROLOGIC - alert, CN 2-12 intact, sensation nl, motor 5/5 RUE/LUE/RLE/LLE.

## 2018-03-05 VITALS
DIASTOLIC BLOOD PRESSURE: 95 MMHG | SYSTOLIC BLOOD PRESSURE: 147 MMHG | OXYGEN SATURATION: 100 % | TEMPERATURE: 99 F | HEART RATE: 88 BPM | RESPIRATION RATE: 16 BRPM

## 2018-03-05 PROCEDURE — 93016 CV STRESS TEST SUPVJ ONLY: CPT | Mod: GC

## 2018-03-05 PROCEDURE — 78452 HT MUSCLE IMAGE SPECT MULT: CPT | Mod: 26

## 2018-03-05 PROCEDURE — 99217: CPT | Mod: GC

## 2018-03-05 PROCEDURE — 93283 PRGRMG EVAL IMPLANTABLE DFB: CPT | Mod: 26,GC

## 2018-03-05 PROCEDURE — 93018 CV STRESS TEST I&R ONLY: CPT | Mod: GC

## 2018-03-05 RX ADMIN — Medication 10 MILLIGRAM(S): at 09:30

## 2018-03-05 RX ADMIN — LOSARTAN POTASSIUM 25 MILLIGRAM(S): 100 TABLET, FILM COATED ORAL at 06:22

## 2018-03-05 NOTE — CHART NOTE - NSCHARTNOTEFT_GEN_A_CORE
Division of Electrophysiology  Device Interrogation Report    Interrogation of: [x] Pacemaker [ ] Defibrillator    Indication for Interrogation: palpitations    Report: normal function    : [employer] dual chamber    Battery Status: 8 years 3 months left    Lead:               Amplitude (Sense)     Threshold      Impedance   Atrial:                      1.5                                             448  RV:                          9.5                         0.9               526  LV:    Comments / Events: AF last on 2/28, no other events    Changes Made: none

## 2018-03-05 NOTE — ED CDU PROVIDER SUBSEQUENT DAY NOTE - HISTORY
Pt is an 83 y/o F pmh htn, paroxysmal afib on coumadin, sick sinus syndrome, s/p PPM, CAD c/o palpitations and chest pressure x two days.  Pt states while seated, completing taxes pt experienced pounding palpitations associated with chest pressure, which would come and go throughout the day, resolving overnight.  Pt then had recurrence during the day yesterday, prompting visit to ED.  At times, pt has associated lightheadedness.  Pt states she had stress test 2 years ago, showing mild CAD and pt was medically managed.  Pt denies any fevers, chills, nausea, vomiting, SOB, jaw/neck/arm/back/abdominal pain, calf pain/swelling, h/o dvt/pe in past, hemoptysis, calf pain/swelling.  now associated with lightheadedness. Pt sent to CDU for telemetry, stress test.

## 2018-03-05 NOTE — ED CDU PROVIDER DISPOSITION NOTE - CLINICAL COURSE
AJM: Pt seen with PA. Pt is an 81 y/o F pmh htn, paroxysmal afib on coumadin, sick sinus syndrome, s/p PPM, CAD c/o palpitations and chest pressure x two days.  Pt states while seated, completing taxes pt experienced pounding palpitations associated with chest pressure, which would come and go throughout the day, resolving overnight.  Pt then had recurrence during the day yesterday, prompting visit to ED.  At times, pt has associated lightheadedness.  Pt states she had stress test 2 years ago, showing mild CAD and pt was medically managed.  Pt denies any fevers, chills, nausea, vomiting, SOB, jaw/neck/arm/back/abdominal pain, calf pain/swelling, h/o dvt/pe in past, hemoptysis, calf pain/swelling.  now associated with lightheadedness. Pt notes she has been feeling occasional palpitations overnight but no events on telemetry noted. PPM was interrogated which showed no acute events. Normal stress test. Neg serial trop. Pt stable for dc home with cardiology follow up. Results of workup discussed with patient and she was given a copy of all results. Pt feels comfortable with dc home plan.

## 2018-03-05 NOTE — ED CDU PROVIDER SUBSEQUENT DAY NOTE - PROGRESS NOTE DETAILS
CDU PA Baseil: Received signout on patient- seen and examined this morning with attending. Patient rested comfortably overnight, no chest pain, although endorses continued intermittent palpitations. CE negative, EKG unchanged. Patient is improved and stable for stress test this morning. EP consulted for pacemaker interrogation. Will continue to monitor and observe.

## 2018-03-05 NOTE — ED CDU PROVIDER SUBSEQUENT DAY NOTE - MEDICAL DECISION MAKING DETAILS
Pt is an 83 y/o F pmh htn, paroxysmal afib on coumadin, sick sinus syndrome, s/p PPM, CAD c/o palpitations and chest pressure x two days -- r/o ACS, not clinically concerning for PE, not clinically concerning for aortic dissection -- telemetry, stress, PPM interrogation

## 2018-03-05 NOTE — CONSULT NOTE ADULT - SUBJECTIVE AND OBJECTIVE BOX
HISTORY OF PRESENT ILLNESS:    82 year old woman well known to our service  with history of paroxysmal atrial fibrillation, on Coumadin, sick sinus syndrome, Biotronik dual chamber pacemaker, Non obstructive CAD on cardiac cath 3/16 with historically NL LV function on TTE 3/16,  pulmonary fibrosis, hypertension, admitted with palpitations and chest pressure.  She had not had a recent ischemic work up.  Denies LOC/syncope.     PAST MEDICAL & SURGICAL HISTORY:  Asthma  Fatty liver  GERD (gastroesophageal reflux disease)  Lightheadedness  Atrial Fibrillation  Hypertension  H/O fracture of tibia: required prasad and screws, April 2015  Artificial cardiac pacemaker: 10/2014  S/P cataract extraction: bilateral, 2013  S/P hernia repair: left groin, 2010    FAMILY HISTORY:  Family history of abdominal aortic aneurysm  Family history of stroke  Family history of hypertension (Mother): mother    SOCIAL HISTORY:    (x ) Non-smoker ( ) Smoker ( ) Alcohol Abuse ( ) IVDA    Allergies    adhesives (Rash)  Augmentin (Rash)  clavulanate (Rash)  Levaquin (Rash)    MEDICATIONS  (STANDING):  atorvastatin 10 milliGRAM(s) Oral at bedtime  diltiazem    milliGRAM(s) Oral daily  losartan 25 milliGRAM(s) Oral daily  predniSONE   Tablet 10 milliGRAM(s) Oral daily    REVIEW OF SYSTEMS:     CONSTITUTIONAL: No fever, chills, weight loss, or fatigue  RESPIRATORY: No cough, wheezing, chills or hemoptysis; No Shortness of Breath  CARDIOVASCULAR: see HPI  GASTROINTESTINAL: No abdominal pain. No nausea, vomiting, diarrhea or constipation. No melena or hematochezia.  GENITOURINARY: No dysuria, frequency, hematuria, or incontinence  NEUROLOGICAL: No headaches, memory loss, loss of strength, numbness, or tremors    [x ] All others negative	  [ ] Unable to obtain    PHYSICAL EXAM:  Vital Signs Last 24 Hrs  T(C): 36.7 (05 Mar 2018 12:21), Max: 36.8 (04 Mar 2018 21:30)  T(F): 98 (05 Mar 2018 12:21), Max: 98.3 (04 Mar 2018 21:30)  HR: 66 (05 Mar 2018 12:21) (62 - 79)  BP: 152/77 (05 Mar 2018 12:21) (125/71 - 152/85)  RR: 16 (05 Mar 2018 12:21) (16 - 18)  SpO2: 98% (05 Mar 2018 12:21) (97% - 100%)    Cardiovascular:  S1 S2 RRR, No JVD, No murmurs  Respiratory: Lungs CTA B/L, No wheeze, rales, or rhonchi	  Psychiatry: A & O x 3, Mood & affect appropriate  Gastrointestinal:  Soft, Non-tender, + BS	  Skin: No rashes, No ecchymoses	  Neurologic: Non-focal  Extremities: No clubbing, cyanosis or edema    DATA:    TELEMETRY: SR, AP	      PREVIOUS DIAGNOSTIC TESTING:      < from: Cardiac Cath Lab - Adult (03.25.16 @ 11:03) >  CORONARY VESSELS: The coronary circulation is left dominant.  LM:   --  LM: Normal.  LAD:   --  Proximal LAD: Normal.  --  Mid LAD: Angiography showed minor luminal irregularities with no flow  limiting lesions.  --  Distal LAD: Angiography showed minor luminal irregularities with no  flow limiting lesions.  --  D1: Angiography showed minor luminal irregularities with no flow  limiting lesions.  CX:   --  Circumflex: Normal.  --  OM1: Normal.  RCA:   --  RCA: Normal. Non-dominant RCA.  COMPLICATIONS: There were no complications.  DIAGNOSTIC RECOMMENDATIONS: Medical management and aggressive risk factor  modification is recommended.    TTE: in office 6/17   Conclusions:   1. Normal left ventricular size with normal systolic function.   2. Mild diastolic dysfunction.   3. Normal right ventricular size and function. Device wire in the right heart.   4. Minimal aortic regurgitation.     < from: Nuclear Stress Test-Pharmacologic (03.05.18 @ 14:10) >  IMPRESSIONS:Normal Study  * Myocardial Perfusion SPECT results are normal.  * Review of raw data shows: The study is of adequate  technical quality  * The left ventricle was normal in size. Normal myocardial  perfusion scan,with no evidence of infarction or inducible  ischemia.  * Given overall findings, the ECG tracings were likely  falsely positive  ------------------------------------------------------------------------  Confirmed on  3/5/2018 - 16:33:51 by Daniel Smith M.D.    < end of copied text >    		  LABS:	 	    CARDIAC MARKERS ( 04 Mar 2018 18:19 )  x     / < 0.06 ng/mL / 52 u/L / 1.64 ng/mL / x      CARDIAC MARKERS ( 04 Mar 2018 12:19 )  x     / < 0.06 ng/mL / 48 u/L / 1.93 ng/mL / x                         12.5   7.35  )-----------( 360      ( 04 Mar 2018 12:19 )             40.1       140  |  99  |  13  ----------------------------<  83  4.1   |  26  |  0.78    Ca    9.2      04 Mar 2018 12:19    TPro  7.2  /  Alb  3.9  /  TBili  0.2  /  DBili  x   /  AST  18  /  ALT  11  /  AlkPhos  78  03-04    ASSESSMENT/PLAN: 	  82 year old woman well known to our service  with history of paroxysmal atrial fibrillation, on Coumadin, sick sinus syndrome, Biotronik dual chamber pacemaker, Non obstructive CAD on cardiac cath 3/16 with historically NL LV function on TTE 3/16,  pulmonary fibrosis, hypertension, admitted with palpitations and chest pressure.      --ACS ruled out with serial CE  --PPM check with normal functioning PPM  --NST with no ischemia or infarct  --F/U Dr Nunes in 1-2 weeks    Mary Anne Reynolds PA-C  [facility]  [address]   [place] [address]  office [phone]  pager [phone]

## 2018-03-05 NOTE — ED CDU PROVIDER SUBSEQUENT DAY NOTE - ATTENDING CONTRIBUTION TO CARE
AJM: Pt seen with PA. Pt is an 83 y/o F pmh htn, paroxysmal afib on coumadin, sick sinus syndrome, s/p PPM, CAD c/o palpitations and chest pressure x two days.  Pt states while seated, completing taxes pt experienced pounding palpitations associated with chest pressure, which would come and go throughout the day, resolving overnight.  Pt then had recurrence during the day yesterday, prompting visit to ED.  At times, pt has associated lightheadedness.  Pt states she had stress test 2 years ago, showing mild CAD and pt was medically managed.  Pt denies any fevers, chills, nausea, vomiting, SOB, jaw/neck/arm/back/abdominal pain, calf pain/swelling, h/o dvt/pe in past, hemoptysis, calf pain/swelling.  now associated with lightheadedness. Pt notes she has been feeling occasional palpitations overnight but no events on telemetry noted. Will interrogate pacemaker today and obtain stress. Pt comfortable with plan.

## 2018-03-05 NOTE — CONSULT NOTE ADULT - ATTENDING COMMENTS
Agree with above.   -NST with no ischemia   -no further cardiac workup needed at this time    Chemo Fung MD  [facility]  [address] [facility] [facility] [address], [address]  [place] [address]  office: [phone]  pager: [phone]

## 2018-04-13 ENCOUNTER — APPOINTMENT (OUTPATIENT)
Dept: UROGYNECOLOGY | Facility: CLINIC | Age: 83
End: 2018-04-13
Payer: MEDICARE

## 2018-04-13 PROCEDURE — 99213 OFFICE O/P EST LOW 20 MIN: CPT

## 2018-04-13 RX ORDER — PREDNISONE 5 MG/1
5 TABLET ORAL
Qty: 30 | Refills: 0 | Status: DISCONTINUED | COMMUNITY
Start: 2018-03-15

## 2018-04-13 RX ORDER — NITROFURANTOIN (MONOHYDRATE/MACROCRYSTALS) 25; 75 MG/1; MG/1
100 CAPSULE ORAL
Qty: 14 | Refills: 0 | Status: DISCONTINUED | COMMUNITY
Start: 2018-01-28

## 2018-04-13 RX ORDER — PREDNISONE 10 MG/1
10 TABLET ORAL
Qty: 30 | Refills: 0 | Status: DISCONTINUED | COMMUNITY
Start: 2018-02-28

## 2018-04-13 RX ORDER — ALENDRONATE SODIUM 70 MG/1
70 TABLET ORAL
Qty: 12 | Refills: 0 | Status: DISCONTINUED | COMMUNITY
Start: 2018-02-28

## 2018-04-18 NOTE — ED CDU PROVIDER SUBSEQUENT DAY NOTE - PHYSICAL EXAMINATION
pt c/o pain to rt 4th finger since cutting hand on Sunday on glass bowl No LE edema.  NO calf tenderness.  No palpable cords.

## 2018-07-05 ENCOUNTER — EMERGENCY (EMERGENCY)
Facility: HOSPITAL | Age: 83
LOS: 1 days | Discharge: HOME | End: 2018-07-05
Attending: EMERGENCY MEDICINE | Admitting: EMERGENCY MEDICINE
Payer: MEDICARE

## 2018-07-05 VITALS
HEART RATE: 71 BPM | SYSTOLIC BLOOD PRESSURE: 130 MMHG | TEMPERATURE: 98 F | DIASTOLIC BLOOD PRESSURE: 59 MMHG | RESPIRATION RATE: 16 BRPM | OXYGEN SATURATION: 100 %

## 2018-07-05 VITALS
RESPIRATION RATE: 18 BRPM | DIASTOLIC BLOOD PRESSURE: 75 MMHG | TEMPERATURE: 98 F | SYSTOLIC BLOOD PRESSURE: 147 MMHG | HEART RATE: 93 BPM

## 2018-07-05 DIAGNOSIS — Z95.0 PRESENCE OF CARDIAC PACEMAKER: Chronic | ICD-10-CM

## 2018-07-05 DIAGNOSIS — Z87.81 PERSONAL HISTORY OF (HEALED) TRAUMATIC FRACTURE: Chronic | ICD-10-CM

## 2018-07-05 LAB
ALBUMIN SERPL ELPH-MCNC: 4.3 G/DL — SIGNIFICANT CHANGE UP (ref 3.3–5)
ALP SERPL-CCNC: 79 U/L — SIGNIFICANT CHANGE UP (ref 40–120)
ALT FLD-CCNC: 12 U/L — SIGNIFICANT CHANGE UP (ref 4–33)
APPEARANCE UR: SIGNIFICANT CHANGE UP
APTT BLD: 43.1 SEC — HIGH (ref 27.5–37.4)
AST SERPL-CCNC: 21 U/L — SIGNIFICANT CHANGE UP (ref 4–32)
BASE EXCESS BLDV CALC-SCNC: 0.5 MMOL/L — SIGNIFICANT CHANGE UP
BASOPHILS # BLD AUTO: 0.04 K/UL — SIGNIFICANT CHANGE UP (ref 0–0.2)
BASOPHILS NFR BLD AUTO: 0.6 % — SIGNIFICANT CHANGE UP (ref 0–2)
BILIRUB SERPL-MCNC: 0.3 MG/DL — SIGNIFICANT CHANGE UP (ref 0.2–1.2)
BILIRUB UR-MCNC: NEGATIVE — SIGNIFICANT CHANGE UP
BLOOD GAS VENOUS - CREATININE: 1.22 MG/DL — SIGNIFICANT CHANGE UP (ref 0.5–1.3)
BLOOD UR QL VISUAL: HIGH
BUN SERPL-MCNC: 20 MG/DL — SIGNIFICANT CHANGE UP (ref 7–23)
CALCIUM SERPL-MCNC: 9.6 MG/DL — SIGNIFICANT CHANGE UP (ref 8.4–10.5)
CHLORIDE BLDV-SCNC: 100 MMOL/L — SIGNIFICANT CHANGE UP (ref 96–108)
CHLORIDE SERPL-SCNC: 93 MMOL/L — LOW (ref 98–107)
CO2 SERPL-SCNC: 24 MMOL/L — SIGNIFICANT CHANGE UP (ref 22–31)
COLOR SPEC: YELLOW — SIGNIFICANT CHANGE UP
CREAT SERPL-MCNC: 1.25 MG/DL — SIGNIFICANT CHANGE UP (ref 0.5–1.3)
EOSINOPHIL # BLD AUTO: 0.2 K/UL — SIGNIFICANT CHANGE UP (ref 0–0.5)
EOSINOPHIL NFR BLD AUTO: 3 % — SIGNIFICANT CHANGE UP (ref 0–6)
GAS PNL BLDV: 130 MMOL/L — LOW (ref 136–146)
GLUCOSE BLDV-MCNC: 80 — SIGNIFICANT CHANGE UP (ref 70–99)
GLUCOSE SERPL-MCNC: 88 MG/DL — SIGNIFICANT CHANGE UP (ref 70–99)
GLUCOSE UR-MCNC: NEGATIVE — SIGNIFICANT CHANGE UP
HCO3 BLDV-SCNC: 24 MMOL/L — SIGNIFICANT CHANGE UP (ref 20–27)
HCT VFR BLD CALC: 42.1 % — SIGNIFICANT CHANGE UP (ref 34.5–45)
HCT VFR BLDV CALC: 45.3 % — HIGH (ref 34.5–45)
HGB BLD-MCNC: 13.8 G/DL — SIGNIFICANT CHANGE UP (ref 11.5–15.5)
HGB BLDV-MCNC: 14.8 G/DL — SIGNIFICANT CHANGE UP (ref 11.5–15.5)
IMM GRANULOCYTES # BLD AUTO: 0.03 # — SIGNIFICANT CHANGE UP
IMM GRANULOCYTES NFR BLD AUTO: 0.5 % — SIGNIFICANT CHANGE UP (ref 0–1.5)
INR BLD: 3.48 — HIGH (ref 0.88–1.17)
KETONES UR-MCNC: NEGATIVE — SIGNIFICANT CHANGE UP
LACTATE BLDV-MCNC: 2.2 MMOL/L — HIGH (ref 0.5–2)
LEUKOCYTE ESTERASE UR-ACNC: HIGH
LYMPHOCYTES # BLD AUTO: 0.74 K/UL — LOW (ref 1–3.3)
LYMPHOCYTES # BLD AUTO: 11.2 % — LOW (ref 13–44)
MCHC RBC-ENTMCNC: 28.6 PG — SIGNIFICANT CHANGE UP (ref 27–34)
MCHC RBC-ENTMCNC: 32.8 % — SIGNIFICANT CHANGE UP (ref 32–36)
MCV RBC AUTO: 87.3 FL — SIGNIFICANT CHANGE UP (ref 80–100)
MONOCYTES # BLD AUTO: 0.68 K/UL — SIGNIFICANT CHANGE UP (ref 0–0.9)
MONOCYTES NFR BLD AUTO: 10.3 % — SIGNIFICANT CHANGE UP (ref 2–14)
NEUTROPHILS # BLD AUTO: 4.93 K/UL — SIGNIFICANT CHANGE UP (ref 1.8–7.4)
NEUTROPHILS NFR BLD AUTO: 74.4 % — SIGNIFICANT CHANGE UP (ref 43–77)
NITRITE UR-MCNC: NEGATIVE — SIGNIFICANT CHANGE UP
NON-SQ EPI CELLS # UR AUTO: <1 — SIGNIFICANT CHANGE UP
NRBC # FLD: 0 — SIGNIFICANT CHANGE UP
PCO2 BLDV: 42 MMHG — SIGNIFICANT CHANGE UP (ref 41–51)
PH BLDV: 7.39 PH — SIGNIFICANT CHANGE UP (ref 7.32–7.43)
PH UR: 7 — SIGNIFICANT CHANGE UP (ref 4.6–8)
PLATELET # BLD AUTO: 338 K/UL — SIGNIFICANT CHANGE UP (ref 150–400)
PMV BLD: 9.5 FL — SIGNIFICANT CHANGE UP (ref 7–13)
PO2 BLDV: 38 MMHG — SIGNIFICANT CHANGE UP (ref 35–40)
POTASSIUM BLDV-SCNC: 4.2 MMOL/L — SIGNIFICANT CHANGE UP (ref 3.4–4.5)
POTASSIUM SERPL-MCNC: 4.5 MMOL/L — SIGNIFICANT CHANGE UP (ref 3.5–5.3)
POTASSIUM SERPL-SCNC: 4.5 MMOL/L — SIGNIFICANT CHANGE UP (ref 3.5–5.3)
PROT SERPL-MCNC: 7.8 G/DL — SIGNIFICANT CHANGE UP (ref 6–8.3)
PROT UR-MCNC: 20 MG/DL — SIGNIFICANT CHANGE UP
PROTHROM AB SERPL-ACNC: 39.6 SEC — HIGH (ref 9.8–13.1)
RBC # BLD: 4.82 M/UL — SIGNIFICANT CHANGE UP (ref 3.8–5.2)
RBC # FLD: 13.4 % — SIGNIFICANT CHANGE UP (ref 10.3–14.5)
RBC CASTS # UR COMP ASSIST: SIGNIFICANT CHANGE UP (ref 0–?)
SAO2 % BLDV: 66 % — SIGNIFICANT CHANGE UP (ref 60–85)
SODIUM SERPL-SCNC: 133 MMOL/L — LOW (ref 135–145)
SP GR SPEC: 1.01 — SIGNIFICANT CHANGE UP (ref 1–1.04)
SQUAMOUS # UR AUTO: SIGNIFICANT CHANGE UP
UROBILINOGEN FLD QL: NORMAL MG/DL — SIGNIFICANT CHANGE UP
WBC # BLD: 6.62 K/UL — SIGNIFICANT CHANGE UP (ref 3.8–10.5)
WBC # FLD AUTO: 6.62 K/UL — SIGNIFICANT CHANGE UP (ref 3.8–10.5)
WBC UR QL: >50 — HIGH (ref 0–?)

## 2018-07-05 PROCEDURE — 99284 EMERGENCY DEPT VISIT MOD MDM: CPT | Mod: GC

## 2018-07-05 PROCEDURE — 71046 X-RAY EXAM CHEST 2 VIEWS: CPT | Mod: 26

## 2018-07-05 RX ORDER — CEFTRIAXONE 500 MG/1
1 INJECTION, POWDER, FOR SOLUTION INTRAMUSCULAR; INTRAVENOUS ONCE
Qty: 0 | Refills: 0 | Status: COMPLETED | OUTPATIENT
Start: 2018-07-05 | End: 2018-07-05

## 2018-07-05 RX ORDER — SODIUM CHLORIDE 9 MG/ML
1000 INJECTION INTRAMUSCULAR; INTRAVENOUS; SUBCUTANEOUS ONCE
Qty: 0 | Refills: 0 | Status: COMPLETED | OUTPATIENT
Start: 2018-07-05 | End: 2018-07-05

## 2018-07-05 RX ORDER — CEPHALEXIN 500 MG
1 CAPSULE ORAL
Qty: 14 | Refills: 0 | OUTPATIENT
Start: 2018-07-05 | End: 2018-07-11

## 2018-07-05 RX ADMIN — SODIUM CHLORIDE 1000 MILLILITER(S): 9 INJECTION INTRAMUSCULAR; INTRAVENOUS; SUBCUTANEOUS at 14:32

## 2018-07-05 RX ADMIN — CEFTRIAXONE 100 GRAM(S): 500 INJECTION, POWDER, FOR SOLUTION INTRAMUSCULAR; INTRAVENOUS at 14:57

## 2018-07-05 NOTE — ED ADULT NURSE REASSESSMENT NOTE - NS ED NURSE REASSESS COMMENT FT1
pt to be dc'd, as per Dr. Royer hoyt to dc with current lactate, does not want it to be redrawn. son called to  pt, will be here shortly.

## 2018-07-05 NOTE — ED PROVIDER NOTE - ATTENDING CONTRIBUTION TO CARE
I agree with the above H&P.  Briefly this is a 82 year old female presenting with fatigue.  recently started on bactrim for UTI symptoms. no fever no abd pain no chest pain.  tolerating po.  concern for abx resistance vs occult infection. HD stable.  will check labs, recheck urine send culture, iv fluids.  patient denies cp or sob.  no concern for acs or chf.  if All WNL, will switch abx to ctx and give patient strong return precautions.  patient agrees with plan

## 2018-07-05 NOTE — ED PROVIDER NOTE - OBJECTIVE STATEMENT
82F pmhx asthma, afib on coumadin w/ pacemaker, fatty liver, c/o diffuse weakness and feeling of "queeziness" over the last few days since starting bactrim for cystitis she was dx'd with sue the wkd at . Pt went to  for dysuria, suprapubic pain, and fever which have all resolved since starting the abx. However as mentioned pt now feels weak and fatigued generally. Pt denies vomiting, diarrhea, cp/sob.

## 2018-07-05 NOTE — ED ADULT NURSE NOTE - OBJECTIVE STATEMENT
Pt presents to room 19, A&Ox3, ambulatory at baseline with a cane, here for evaluation of generalized weakness, nausea, and decrease po intake x 1 day.  pmhx of asthma, htn, gerd, afib on coumadin, pacemaker, recently dx'd with a uti currently on po bactrim. pt reports that her urinary symptoms have resolved.  Denies any chest pain, dizziness, vomiting, shortness of breath, palpitations, diarrhea, fever, constipation, or chills. Pt presents to room 19, A&Ox3, ambulatory at baseline with a cane, here for evaluation of generalized weakness, nausea, and decrease po intake x 1 day.  pmhx of asthma, htn, gerd, afib on coumadin, pacemaker, recently dx'd with a uti currently on po bactrim. pt reports that her urinary symptoms have resolved.  Denies any chest pain, dizziness, vomiting, shortness of breath, palpitations, diarrhea, fever, constipation, or chills. IV established in left ac with a 20g, labs drawn and sent, call bell in reach, side rails up, bed in locked position, md evaluation in progress, will continue to monitor.

## 2018-07-05 NOTE — ED PROVIDER NOTE - PLAN OF CARE
1) Please follow-up with your primary care doctor within the next 3 days.  If you cannot follow-up with your doctor(s), please return to the ED for any urgent issues.  2) If you have any worsening of symptoms or any other concerns please return to the ED immediately.  3) Please continue taking your home medications as directed.  4) You may have been given a copy of your labs and/or imaging.  Please go over these with your primary care doctor.   5) A prescription has been sent to your pharmacy. Please take it as directed.

## 2018-07-05 NOTE — ED PROVIDER NOTE - MEDICAL DECISION MAKING DETAILS
82F on bactrim for recently dx'd cystitis here w/ diffuse weakness. Pt appears as stated age, NAD. Lungs cTA, S1S1 RRR. No focal weakness or deficits. Metabolic vs Infectious, will check labs, cultures, cxr, urine, coags.

## 2018-07-05 NOTE — ED ADULT TRIAGE NOTE - CHIEF COMPLAINT QUOTE
Pt currently being treated for uti since last Saturday with Bactrim DS .  Pt reports now over past few days not feeling well with decreased appetite with mild nausea .Pt denies any pain .

## 2018-07-06 LAB
SPECIMEN SOURCE: SIGNIFICANT CHANGE UP

## 2018-07-08 LAB
-  AMIKACIN: SIGNIFICANT CHANGE UP
-  AZTREONAM: SIGNIFICANT CHANGE UP
-  CEFEPIME: SIGNIFICANT CHANGE UP
-  CEFTAZIDIME: SIGNIFICANT CHANGE UP
-  CIPROFLOXACIN: SIGNIFICANT CHANGE UP
-  GENTAMICIN: SIGNIFICANT CHANGE UP
-  IMIPENEM: SIGNIFICANT CHANGE UP
-  LEVOFLOXACIN: SIGNIFICANT CHANGE UP
-  MEROPENEM: SIGNIFICANT CHANGE UP
-  PIPERACILLIN/TAZOBACTAM: SIGNIFICANT CHANGE UP
-  TOBRAMYCIN: SIGNIFICANT CHANGE UP
BACTERIA UR CULT: SIGNIFICANT CHANGE UP
METHOD TYPE: SIGNIFICANT CHANGE UP
ORGANISM # SPEC MICROSCOPIC CNT: SIGNIFICANT CHANGE UP
ORGANISM # SPEC MICROSCOPIC CNT: SIGNIFICANT CHANGE UP

## 2018-07-09 NOTE — ED POST DISCHARGE NOTE - RESULT SUMMARY
UCX: Pseudomonas aeruginosa 10,000 - 49,000.  Pt was discharged on cephalexin.  Culture sensitive to cefepime.  Will contact pt to see how she is feeling.

## 2018-07-09 NOTE — ED POST DISCHARGE NOTE - DETAILS
[phone] - Pt states she is feeling better.  Denies fever, chills, dysuria, hematuria or suprapubic pain.  Recommended to follow up with PMD.

## 2018-07-10 LAB
BACTERIA BLD CULT: SIGNIFICANT CHANGE UP
BACTERIA BLD CULT: SIGNIFICANT CHANGE UP

## 2018-07-13 ENCOUNTER — APPOINTMENT (OUTPATIENT)
Dept: UROGYNECOLOGY | Facility: CLINIC | Age: 83
End: 2018-07-13
Payer: MEDICARE

## 2018-07-13 PROCEDURE — 99213 OFFICE O/P EST LOW 20 MIN: CPT

## 2018-07-13 RX ORDER — PREDNISONE 1 MG/1
1 TABLET ORAL
Qty: 120 | Refills: 0 | Status: DISCONTINUED | COMMUNITY
Start: 2018-04-23

## 2018-07-13 RX ORDER — SULFAMETHOXAZOLE AND TRIMETHOPRIM 800; 160 MG/1; MG/1
800-160 TABLET ORAL
Qty: 14 | Refills: 0 | Status: DISCONTINUED | COMMUNITY
Start: 2018-06-30

## 2018-07-13 RX ORDER — CYCLOBENZAPRINE HYDROCHLORIDE 5 MG/1
5 TABLET, FILM COATED ORAL
Qty: 10 | Refills: 0 | Status: DISCONTINUED | COMMUNITY
Start: 2018-06-06

## 2018-07-13 RX ORDER — CEPHALEXIN 500 MG/1
500 CAPSULE ORAL
Qty: 14 | Refills: 0 | Status: DISCONTINUED | COMMUNITY
Start: 2018-07-05

## 2018-07-22 PROBLEM — Z78.9 ALCOHOL USE: Status: ACTIVE | Noted: 2017-12-04

## 2018-09-26 ENCOUNTER — RECORD ABSTRACTING (OUTPATIENT)
Age: 83
End: 2018-09-26

## 2018-09-26 DIAGNOSIS — J84.10 PULMONARY FIBROSIS, UNSPECIFIED: ICD-10-CM

## 2018-09-26 RX ORDER — PREDNISONE 5 MG/1
5 TABLET ORAL DAILY
Refills: 0 | Status: ACTIVE | COMMUNITY

## 2018-10-19 ENCOUNTER — APPOINTMENT (OUTPATIENT)
Dept: UROGYNECOLOGY | Facility: CLINIC | Age: 83
End: 2018-10-19
Payer: MEDICARE

## 2018-10-19 PROCEDURE — 99213 OFFICE O/P EST LOW 20 MIN: CPT

## 2018-10-29 ENCOUNTER — MESSAGE (OUTPATIENT)
Age: 83
End: 2018-10-29

## 2018-10-29 ENCOUNTER — APPOINTMENT (OUTPATIENT)
Dept: UROGYNECOLOGY | Facility: CLINIC | Age: 83
End: 2018-10-29
Payer: MEDICARE

## 2018-10-29 DIAGNOSIS — N95.0 POSTMENOPAUSAL BLEEDING: ICD-10-CM

## 2018-10-29 PROCEDURE — 99213 OFFICE O/P EST LOW 20 MIN: CPT

## 2018-11-06 ENCOUNTER — APPOINTMENT (OUTPATIENT)
Dept: ULTRASOUND IMAGING | Facility: IMAGING CENTER | Age: 83
End: 2018-11-06
Payer: MEDICARE

## 2018-11-06 ENCOUNTER — OUTPATIENT (OUTPATIENT)
Dept: OUTPATIENT SERVICES | Facility: HOSPITAL | Age: 83
LOS: 1 days | End: 2018-11-06
Payer: MEDICARE

## 2018-11-06 DIAGNOSIS — N95.0 POSTMENOPAUSAL BLEEDING: ICD-10-CM

## 2018-11-06 DIAGNOSIS — Z87.81 PERSONAL HISTORY OF (HEALED) TRAUMATIC FRACTURE: Chronic | ICD-10-CM

## 2018-11-06 DIAGNOSIS — Z95.0 PRESENCE OF CARDIAC PACEMAKER: Chronic | ICD-10-CM

## 2018-11-06 PROCEDURE — 76830 TRANSVAGINAL US NON-OB: CPT

## 2018-11-06 PROCEDURE — 76830 TRANSVAGINAL US NON-OB: CPT | Mod: 26

## 2018-11-06 PROCEDURE — 76856 US EXAM PELVIC COMPLETE: CPT

## 2018-11-06 PROCEDURE — 76856 US EXAM PELVIC COMPLETE: CPT | Mod: 26

## 2018-11-13 ENCOUNTER — APPOINTMENT (OUTPATIENT)
Dept: UROGYNECOLOGY | Facility: CLINIC | Age: 83
End: 2018-11-13
Payer: MEDICARE

## 2018-11-13 DIAGNOSIS — N89.8 OTHER SPECIFIED NONINFLAMMATORY DISORDERS OF VAGINA: ICD-10-CM

## 2018-11-13 DIAGNOSIS — N81.3 COMPLETE UTEROVAGINAL PROLAPSE: ICD-10-CM

## 2018-11-13 DIAGNOSIS — N95.2 POSTMENOPAUSAL ATROPHIC VAGINITIS: ICD-10-CM

## 2018-11-13 PROCEDURE — 99213 OFFICE O/P EST LOW 20 MIN: CPT

## 2018-12-04 ENCOUNTER — APPOINTMENT (OUTPATIENT)
Dept: PULMONOLOGY | Facility: CLINIC | Age: 83
End: 2018-12-04

## 2018-12-14 ENCOUNTER — APPOINTMENT (OUTPATIENT)
Dept: UROGYNECOLOGY | Facility: CLINIC | Age: 83
End: 2018-12-14

## 2019-10-17 ENCOUNTER — IMPORTED ENCOUNTER (OUTPATIENT)
Dept: URBAN - METROPOLITAN AREA CLINIC 31 | Facility: CLINIC | Age: 84
End: 2019-10-17

## 2019-10-17 PROBLEM — H40.013: Noted: 2019-10-17

## 2019-10-17 PROBLEM — H43.812: Noted: 2019-10-17

## 2019-10-17 PROBLEM — Z96.1: Noted: 2019-10-17

## 2019-10-17 PROCEDURE — 92015 DETERMINE REFRACTIVE STATE: CPT

## 2019-10-17 PROCEDURE — 92250 FUNDUS PHOTOGRAPHY W/I&R: CPT

## 2019-10-17 PROCEDURE — 92014 COMPRE OPH EXAM EST PT 1/>: CPT

## 2019-10-17 NOTE — PATIENT DISCUSSION
1.  Pseudophakia OU - IOLs stable. Monitor. 2. PVD OS: Patient was cautioned to call our office immediately if they experience a substantial change in their symptoms such as an increase in floaters persistent flashes loss of visual field (may appear as a shadow or a curtain) or decrease in visual acuity as these may indicate a retinal tear or detachment. If this is a new problem patient will need to return for re-examination  as determined by the [address]. Glaucoma suspect OU - No signs of glaucomatous damage to the optic nerve based on todays examination and testing. Will continue to monitor. [facility] OCT in [facility] will follow up with TA check after.

## 2019-11-11 ENCOUNTER — IMPORTED ENCOUNTER (OUTPATIENT)
Dept: URBAN - METROPOLITAN AREA CLINIC 31 | Facility: CLINIC | Age: 84
End: 2019-11-11

## 2019-11-11 PROCEDURE — 92133 CPTRZD OPH DX IMG PST SGM ON: CPT

## 2019-11-19 ENCOUNTER — IMPORTED ENCOUNTER (OUTPATIENT)
Dept: URBAN - METROPOLITAN AREA CLINIC 31 | Facility: CLINIC | Age: 84
End: 2019-11-19

## 2019-11-19 PROBLEM — H40.1112: Noted: 2019-11-19

## 2019-11-19 PROBLEM — H40.1121: Noted: 2019-11-19

## 2019-11-19 PROBLEM — H40.013: Noted: 2019-11-19

## 2019-11-19 PROCEDURE — 99213 OFFICE O/P EST LOW 20 MIN: CPT

## 2019-11-19 NOTE — PATIENT DISCUSSION
1.  Primary open angle glaucoma OD moderate- Continue with current treatment plan. Discussed importance of compliance. Will continue to monitor. 2.  Primary open angle glaucoma OS mild - Continue with current treatment plan. Discussed importance of compliance. Will continue to monitor. Explained in detail problem with nerve OD>>OS. Will need a VF to determine extent of glc and another IOP to determine Tx? Patient somewhat resistant to diagnostic testing.

## 2020-01-30 ENCOUNTER — IMPORTED ENCOUNTER (OUTPATIENT)
Dept: URBAN - METROPOLITAN AREA CLINIC 31 | Facility: CLINIC | Age: 85
End: 2020-01-30

## 2020-01-30 PROBLEM — Z96.1: Noted: 2020-01-30

## 2020-01-30 PROBLEM — H40.1112: Noted: 2020-01-30

## 2020-01-30 PROBLEM — H40.1221: Noted: 2020-01-30

## 2020-01-30 PROBLEM — H40.1131: Noted: 2020-01-30

## 2020-01-30 PROBLEM — H40.1121: Noted: 2020-01-30

## 2020-01-30 PROBLEM — H40.1212: Noted: 2020-01-30

## 2020-01-30 PROCEDURE — 99213 OFFICE O/P EST LOW 20 MIN: CPT

## 2020-01-30 PROCEDURE — 92083 EXTENDED VISUAL FIELD XM: CPT

## 2020-01-30 NOTE — PATIENT DISCUSSION
Low Tension Glaucoma OD - Continue with current treatment plan. Discussed importance of compliance.   Will continue to monitor for stability or progression

## 2020-01-30 NOTE — PATIENT DISCUSSION
Primary open angle glaucoma OS mild -  Continue with current treatment plan. Discussed importance of compliance. Will continue to monitor for stability or progression.

## 2020-01-30 NOTE — PATIENT DISCUSSION
1.  Pseudophakia OU - IOLs stable. Monitorfor changes in vision. 2. Primary open angle glaucoma OD moderate- Continue with current treatment plan. Discussed importance of compliance. Will continue to monitor for stability or progression. 3.  Low Tension Glaucoma OD - Continue with current treatment plan. Discussed importance of compliance. Will continue to monitor for stability or progression   4. Low Tension Glaucoma OS:  Continue with current treatment plan. Discussed importance of compliance. Will continue to monitor for stability or progression  Low tension glc OD/OS. low IOP and thick corneas. Poor OCT and VF defect OD less OS. Monitor with IOP check 6 mos and repeat OCT and VF in a year. No drops for now.

## 2020-01-30 NOTE — PATIENT DISCUSSION
Primary open angle glaucoma OD moderate- Continue with current treatment plan. Discussed importance of compliance. Will continue to monitor for stability or progression.

## 2020-01-30 NOTE — PATIENT DISCUSSION
Low Tension Glaucoma OS:  Continue with current treatment plan. Discussed importance of compliance.   Will continue to monitor for stability or progression

## 2020-06-02 NOTE — ED CDU PROVIDER INITIAL DAY NOTE - OBJECTIVE STATEMENT
81 y/o female pmh htn, paroxysmal afib on coumadin, CAD c/o palpitations and chest pressure x1 day. Pt admits to doing her taxes yesterday and developed "pounding" in her chest. Pt admits to chest pressure. Pt states that symptoms waxed and waned all day. Pt admits to feeling symptoms again today, now associated with lightheadedness. Pt denies sob, diaphoresis, n/v/d, numbness, tingling, syncope, fever or chills.  Pt had cath in 2016 w/ showed minor luminal irregularities managed with medicine.
(4) no limitation

## 2020-07-30 ENCOUNTER — IMPORTED ENCOUNTER (OUTPATIENT)
Dept: URBAN - METROPOLITAN AREA CLINIC 31 | Facility: CLINIC | Age: 85
End: 2020-07-30

## 2020-07-30 PROBLEM — H04.123: Noted: 2020-07-30

## 2020-07-30 PROBLEM — H40.1221: Noted: 2020-07-30

## 2020-07-30 PROBLEM — H40.1212: Noted: 2020-07-30

## 2020-07-30 PROBLEM — Z96.1: Noted: 2020-07-30

## 2020-07-30 PROCEDURE — 99213 OFFICE O/P EST LOW 20 MIN: CPT

## 2020-07-30 NOTE — PATIENT DISCUSSION
1.  Pseudophakia OU - IOLs stable. Monitor for changes in vision. 2. Low Tension Glaucoma OS:  Continue with current treatment plan. Discussed importance of compliance. Will continue to monitor for stability or progression  3. Low Tension Glaucoma OD - Continue with current treatment plan. Discussed importance of compliance. Will continue to monitor for stability or progression   4. Dry Eye OU:  Continue current management with Artificial Tears. Gel QID OD. HS OU. F/U one week.

## 2020-08-05 ENCOUNTER — IMPORTED ENCOUNTER (OUTPATIENT)
Dept: URBAN - METROPOLITAN AREA CLINIC 31 | Facility: CLINIC | Age: 85
End: 2020-08-05

## 2020-08-05 PROBLEM — H04.123: Noted: 2020-08-05

## 2020-08-05 PROCEDURE — 99213 OFFICE O/P EST LOW 20 MIN: CPT

## 2020-08-05 NOTE — PATIENT DISCUSSION
Dry Eye OU:  Continue current management with gel or ointment hs. Explained lagophthalmos and MARIANN. .  6 mos CE/VF

## 2021-01-29 ENCOUNTER — IMPORTED ENCOUNTER (OUTPATIENT)
Dept: URBAN - METROPOLITAN AREA CLINIC 31 | Facility: CLINIC | Age: 86
End: 2021-01-29

## 2021-01-29 PROBLEM — H11.32: Noted: 2021-01-29

## 2021-01-29 PROCEDURE — 99213 OFFICE O/P EST LOW 20 MIN: CPT

## 2021-04-20 ENCOUNTER — IMPORTED ENCOUNTER (OUTPATIENT)
Dept: URBAN - METROPOLITAN AREA CLINIC 31 | Facility: CLINIC | Age: 86
End: 2021-04-20

## 2021-04-20 PROBLEM — H40.1232: Noted: 2021-04-20

## 2021-04-20 PROBLEM — H40.1212: Noted: 2021-04-20

## 2021-04-20 PROBLEM — H40.1231: Noted: 2021-04-20

## 2021-04-20 PROBLEM — Z96.1: Noted: 2021-04-20

## 2021-04-20 PROBLEM — H40.1221: Noted: 2021-04-20

## 2021-04-20 PROCEDURE — 92083 EXTENDED VISUAL FIELD XM: CPT

## 2021-04-20 PROCEDURE — 92134 CPTRZ OPH DX IMG PST SGM RTA: CPT

## 2021-04-20 PROCEDURE — 92014 COMPRE OPH EXAM EST PT 1/>: CPT

## 2021-04-20 NOTE — PATIENT DISCUSSION
1.  Low Tension Glaucoma OS:  Continue with current treatment plan. Discussed importance of compliance. Will continue to monitor for stability or progression  2. Low Tension Glaucoma OD - Continue with current treatment plan. Discussed importance of compliance. Will continue to monitor for stability or progression   3. Pseudophakia OU - IOLs stable. Monitor for changes in vision. 6 mos IOP check and one year VF.

## 2021-04-20 NOTE — PATIENT DISCUSSION
Low Tension Glaucoma OU:  Continue with current treatment plan. Discussed importance of compliance.   Will continue to monitor for stability or progression

## 2021-10-14 NOTE — ED ADULT NURSE NOTE - NS ED NURSE LEVEL OF CONSCIOUSNESS ORIENTATION
Oriented - self; Oriented - place; Oriented - time Plan: Discussed using Rhofade cream bid or she can discuss topical Skin Seuticals with  Detail Level: Zone Render In Strict Bullet Format?: No

## 2022-04-01 ASSESSMENT — TONOMETRY
OS_IOP_MMHG: 14
OD_IOP_MMHG: 13
OD_IOP_MMHG: 14
OS_IOP_MMHG: 13
OD_IOP_MMHG: 12
OS_IOP_MMHG: 14
OD_IOP_MMHG: 14
OD_IOP_MMHG: 14

## 2022-04-01 ASSESSMENT — VISUAL ACUITY
OD_CC: 20/40+2
OS_CC: J1+16''
OS_CC: 20/25-3
OD_CC: 20/30-2
OS_CC: J1+15''
OS_CC: J1+14''
OS_SC: 20/20
OS_SC: 20/20
OS_CC: J117''
OS_CC: 20/40+2
OD_SC: 20/20
OD_SC: 20/20-1
OD_SC: 20/20
OD_SC: 20/20-1
OD_CC: 20/40
OD_CC: J218''
OD_CC: J117''
OD_CC: 20/40-3
OD_SC: 20/20
OS_CC: J1+18''
OS_SC: 20/20-1
OS_CC: J1+18''
OS_CC: 20/30-1
OD_PH: SC 20/25 -2
OS_SC: 20/20-1
OD_CC: J114''
OS_SC: 20/20-1
OD_CC: J1+15''
OD_CC: J1+16''
OD_CC: J118''
OS_SC: 20/20-1
OS_CC: 20/30-2
OD_CC: 20/30-2
OS_CC: 20/30-2
OD_SC: 20/20

## 2022-04-13 ENCOUNTER — ESTABLISHED PATIENT (OUTPATIENT)
Dept: URBAN - METROPOLITAN AREA CLINIC 31 | Facility: CLINIC | Age: 87
End: 2022-04-13

## 2022-04-13 DIAGNOSIS — H40.1221: ICD-10-CM

## 2022-04-13 DIAGNOSIS — Z96.1: ICD-10-CM

## 2022-04-13 DIAGNOSIS — H40.1231: ICD-10-CM

## 2022-04-13 PROCEDURE — 92014 COMPRE OPH EXAM EST PT 1/>: CPT

## 2022-04-13 PROCEDURE — 92250 FUNDUS PHOTOGRAPHY W/I&R: CPT

## 2022-04-13 PROCEDURE — 92083 EXTENDED VISUAL FIELD XM: CPT

## 2022-04-13 RX ORDER — BIMATOPROST 0.1 MG/ML: 1 SOLUTION/ DROPS OPHTHALMIC EVERY EVENING

## 2022-04-13 ASSESSMENT — PACHYMETRY
OS_CT_UM: 605
OD_CT_UM: 595

## 2022-04-13 ASSESSMENT — VISUAL ACUITY
OD_CC: J2
OS_CC: 20/20
OD_CC: 20/20-2
OS_CC: J1+

## 2022-04-13 ASSESSMENT — TONOMETRY
OS_IOP_MMHG: 13
OD_IOP_MMHG: 13

## 2022-04-13 NOTE — PATIENT DISCUSSION
Low Tension Glaucoma OD - Continue with current treatment plan. Discussed importance of compliance. Will continue to monitor for stability or progression.

## 2022-04-13 NOTE — PATIENT DISCUSSION
Low Tension Glaucoma OU:  Continue with current treatment plan. Discussed importance of compliance. Will continue to monitor for stability or progression.

## 2022-05-11 ENCOUNTER — FOLLOW UP (OUTPATIENT)
Dept: URBAN - METROPOLITAN AREA CLINIC 31 | Facility: CLINIC | Age: 87
End: 2022-05-11

## 2022-05-11 DIAGNOSIS — H40.1232: ICD-10-CM

## 2022-05-11 PROCEDURE — 99213 OFFICE O/P EST LOW 20 MIN: CPT

## 2022-05-11 RX ORDER — LATANOPROST 50 UG/ML: 1 SOLUTION/ DROPS OPHTHALMIC EVERY EVENING

## 2022-05-11 ASSESSMENT — TONOMETRY
OD_IOP_MMHG: 10
OS_IOP_MMHG: 10

## 2022-05-11 ASSESSMENT — VISUAL ACUITY
OD_CC: 20/25-2
OS_CC: 20/20

## 2022-07-15 NOTE — PATIENT DISCUSSION
Glaucoma suspect OU - No signs of glaucomatous damage to the optic nerve based on todays examination and testing. Will continue to monitor. Pt will perform bed mobility independently in 2 weeks Patient will perform bed mobility independently within 2 weeks

## 2022-08-25 NOTE — H&P ADULT - PROBLEM SELECTOR PROBLEM 4
89 yo F with PMHx HTN, HLD, CVA, COPD who recently admitted for covid (s/p RDV) discharged last week Wednesday with Home O2 as pt was desatting on ambulation on RA in previous admission. Pt's daughter reports pt has not been using home O2 at all as she was fine on RA at rest and does not usually ambulate due to her CVA history, but for the past 2 days when the daughter tries to move the pt from bed to toilet seat, pt has becoming hypoxic and short of breath and recovers on her own when she rests without any need for O2. Also complains of wet cough with no expectoration. Daughter complains she has been seeing severe pitting edema over her legs which is resolved now. Denies chest pain, abd pain, urinary complaints, nausea and vomiting    #Acute hypoxemic resp failure  #HFpEF  - was hypoxic on RA on presentation; started on 2L; currently on RA  - DDx: Pneumonia (most likely) Vs CHF exacerbation  - CXR looks normal; BNP 2K (near-normal when adjusted for age and sex)  - 1+ pitting edema on forefoot and b/l ronchi on exam  - CT chest showed b/l lower lobe consolidative opacities and pleural based right upper lobe nodular opacity  - 08/16/22 ECHO - EF 58% and G1DD  - F/u procal, BCx, MRSA, Strep Ur Ag, Legionella Ur Ag, RVP; covid + since last admission s/p RDV  - ceftriaxone 1g iv daily and azithromycin 500mg iv daily  - F/u repeat ECHO  - pt is allergic to sulfonamides (loop diuretics) can try ethacrynic acid?  - c/w spironolactone 25mg daily and c/w enalapril 2.5mg daily    #HTN  - c/w home labetalol and nifedipine    #CVA  - residual Rt sided weakness    #COPD  - not in exacerbation  - c/w albuterol and spiriva    DVT px - lovenox  Diet - DASH  Activity - AAT    *MED REC confirmed with pt's daughter
Gastroesophageal reflux disease, esophagitis presence not specified

## 2022-09-26 ENCOUNTER — FOLLOW UP (OUTPATIENT)
Dept: URBAN - METROPOLITAN AREA CLINIC 31 | Facility: CLINIC | Age: 87
End: 2022-09-26

## 2022-09-26 DIAGNOSIS — H40.1232: ICD-10-CM

## 2022-09-26 PROCEDURE — 92012 INTRM OPH EXAM EST PATIENT: CPT

## 2022-09-26 ASSESSMENT — VISUAL ACUITY
OS_CC: 20/20
OU_CC: 20/20
OD_CC: 20/20-2

## 2022-09-26 ASSESSMENT — TONOMETRY
OS_IOP_MMHG: 11
OD_IOP_MMHG: 11

## 2023-03-28 ENCOUNTER — OFFICE VISIT (OUTPATIENT)
Dept: URBAN - METROPOLITAN AREA CLINIC 7 | Facility: CLINIC | Age: 88
End: 2023-03-28
Payer: MEDICARE

## 2023-03-28 ENCOUNTER — WEB ENCOUNTER (OUTPATIENT)
Dept: URBAN - METROPOLITAN AREA CLINIC 7 | Facility: CLINIC | Age: 88
End: 2023-03-28

## 2023-03-28 ENCOUNTER — LAB OUTSIDE AN ENCOUNTER (OUTPATIENT)
Dept: URBAN - METROPOLITAN AREA CLINIC 7 | Facility: CLINIC | Age: 88
End: 2023-03-28

## 2023-03-28 ENCOUNTER — TELEPHONE ENCOUNTER (OUTPATIENT)
Dept: URBAN - METROPOLITAN AREA CLINIC 7 | Facility: CLINIC | Age: 88
End: 2023-03-28

## 2023-03-28 VITALS
WEIGHT: 126 LBS | DIASTOLIC BLOOD PRESSURE: 90 MMHG | HEIGHT: 60 IN | BODY MASS INDEX: 24.74 KG/M2 | RESPIRATION RATE: 16 BRPM | SYSTOLIC BLOOD PRESSURE: 140 MMHG | TEMPERATURE: 97.7 F

## 2023-03-28 DIAGNOSIS — Q39.4 CRICOPHARYNGEAL WEB: ICD-10-CM

## 2023-03-28 DIAGNOSIS — R13.13 CRICOPHARYNGEAL DYSPHAGIA: ICD-10-CM

## 2023-03-28 DIAGNOSIS — R13.19 ESOPHAGEAL DYSPHAGIA: ICD-10-CM

## 2023-03-28 PROBLEM — 11266002: Status: ACTIVE | Noted: 2023-03-28

## 2023-03-28 PROBLEM — 235631007: Status: ACTIVE | Noted: 2023-03-28

## 2023-03-28 PROCEDURE — 99204 OFFICE O/P NEW MOD 45 MIN: CPT | Performed by: INTERNAL MEDICINE

## 2023-03-28 RX ORDER — LATANOPROST 50 UG/ML
SOLUTION/ DROPS OPHTHALMIC
Qty: 7.5 MILLILITER | Status: ACTIVE | COMMUNITY

## 2023-03-28 RX ORDER — DILTIAZEM HYDROCHLORIDE 120 MG/1
CAPSULE, EXTENDED RELEASE ORAL
Qty: 180 CAPSULE | Status: ACTIVE | COMMUNITY

## 2023-03-28 RX ORDER — ATORVASTATIN CALCIUM 10 MG/1
TABLET, FILM COATED ORAL
Qty: 90 TABLET | Status: ACTIVE | COMMUNITY

## 2023-03-28 RX ORDER — WARFARIN SODIUM 6 MG/1
TABLET ORAL
Qty: 78 TABLET | Status: ACTIVE | COMMUNITY

## 2023-03-28 RX ORDER — DIGOXIN 125 UG/1
TABLET ORAL
Qty: 45 TABLET | Status: ACTIVE | COMMUNITY

## 2023-03-28 NOTE — HPI-TODAY'S VISIT:
88yo F afib on coumadin, with recurrent and chronic dysphagia dating back years, has had eGD in [place] years ago was told her esophagus was narrow.  She has trouble swallowing both liquids and solids and pills.  says that when she eats or drinks she feels like it pools in her throat high in her neck and then she has to do 3 swallows or drink more water for it to go down.  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  the speech therapy ashia

## 2023-04-25 ENCOUNTER — CLAIMS CREATED FROM THE CLAIM WINDOW (OUTPATIENT)
Dept: URBAN - METROPOLITAN AREA CLINIC 4 | Facility: CLINIC | Age: 88
End: 2023-04-25
Payer: MEDICARE

## 2023-04-25 ENCOUNTER — OFFICE VISIT (OUTPATIENT)
Dept: URBAN - METROPOLITAN AREA SURGERY CENTER 5 | Facility: SURGERY CENTER | Age: 88
End: 2023-04-25
Payer: MEDICARE

## 2023-04-25 DIAGNOSIS — K29.70 CHRONIC ACITVE GASTRITIS (H.PYLORI NEGATIVE): ICD-10-CM

## 2023-04-25 DIAGNOSIS — K22.2 ACQUIRED ESOPHAGEAL RING: ICD-10-CM

## 2023-04-25 DIAGNOSIS — K29.70 GASTRITIS, UNSPECIFIED, WITHOUT BLEEDING: ICD-10-CM

## 2023-04-25 DIAGNOSIS — R13.10 ABNORMAL DEGLUTITION: ICD-10-CM

## 2023-04-25 DIAGNOSIS — K44.9 DIAPHRAGMATIC HERNIA: ICD-10-CM

## 2023-04-25 PROCEDURE — 88312 SPECIAL STAINS GROUP 1: CPT | Performed by: PATHOLOGY

## 2023-04-25 PROCEDURE — 88305 TISSUE EXAM BY PATHOLOGIST: CPT | Performed by: PATHOLOGY

## 2023-04-25 PROCEDURE — 43248 EGD GUIDE WIRE INSERTION: CPT | Performed by: CLINIC/CENTER

## 2023-04-25 PROCEDURE — 43239 EGD BIOPSY SINGLE/MULTIPLE: CPT | Performed by: CLINIC/CENTER

## 2023-04-25 PROCEDURE — 43239 EGD BIOPSY SINGLE/MULTIPLE: CPT | Performed by: INTERNAL MEDICINE

## 2023-04-25 PROCEDURE — 43248 EGD GUIDE WIRE INSERTION: CPT | Performed by: INTERNAL MEDICINE

## 2023-04-25 RX ORDER — ATORVASTATIN CALCIUM 10 MG/1
TABLET, FILM COATED ORAL
Qty: 90 TABLET | Status: ACTIVE | COMMUNITY

## 2023-04-25 RX ORDER — DIGOXIN 125 UG/1
TABLET ORAL
Qty: 45 TABLET | Status: ACTIVE | COMMUNITY

## 2023-04-25 RX ORDER — DILTIAZEM HYDROCHLORIDE 120 MG/1
CAPSULE, EXTENDED RELEASE ORAL
Qty: 180 CAPSULE | Status: ACTIVE | COMMUNITY

## 2023-04-25 RX ORDER — WARFARIN SODIUM 6 MG/1
TABLET ORAL
Qty: 78 TABLET | Status: ACTIVE | COMMUNITY

## 2023-04-25 RX ORDER — LATANOPROST 50 UG/ML
SOLUTION/ DROPS OPHTHALMIC
Qty: 7.5 MILLILITER | Status: ACTIVE | COMMUNITY

## 2023-05-11 ENCOUNTER — TELEPHONE ENCOUNTER (OUTPATIENT)
Dept: URBAN - METROPOLITAN AREA CLINIC 7 | Facility: CLINIC | Age: 88
End: 2023-05-11

## 2023-09-07 ENCOUNTER — LAB OUTSIDE AN ENCOUNTER (OUTPATIENT)
Dept: URBAN - METROPOLITAN AREA CLINIC 7 | Facility: CLINIC | Age: 88
End: 2023-09-07

## 2023-09-07 ENCOUNTER — OFFICE VISIT (OUTPATIENT)
Dept: URBAN - METROPOLITAN AREA CLINIC 7 | Facility: CLINIC | Age: 88
End: 2023-09-07
Payer: MEDICARE

## 2023-09-07 ENCOUNTER — DASHBOARD ENCOUNTERS (OUTPATIENT)
Age: 88
End: 2023-09-07

## 2023-09-07 VITALS
SYSTOLIC BLOOD PRESSURE: 122 MMHG | TEMPERATURE: 97 F | WEIGHT: 130 LBS | BODY MASS INDEX: 25.52 KG/M2 | RESPIRATION RATE: 16 BRPM | HEIGHT: 60 IN | DIASTOLIC BLOOD PRESSURE: 88 MMHG

## 2023-09-07 DIAGNOSIS — R13.19 ESOPHAGEAL DYSPHAGIA: ICD-10-CM

## 2023-09-07 DIAGNOSIS — R13.13 CRICOPHARYNGEAL DYSPHAGIA: ICD-10-CM

## 2023-09-07 DIAGNOSIS — Q39.4 CRICOPHARYNGEAL WEB: ICD-10-CM

## 2023-09-07 PROCEDURE — 99213 OFFICE O/P EST LOW 20 MIN: CPT | Performed by: INTERNAL MEDICINE

## 2023-09-07 RX ORDER — LATANOPROST 50 UG/ML
SOLUTION/ DROPS OPHTHALMIC
Qty: 7.5 MILLILITER | Status: ACTIVE | COMMUNITY

## 2023-09-07 RX ORDER — BISOPROLOL FUMARATE 5 MG/1
1 TABLET TABLET, FILM COATED ORAL ONCE A DAY
Status: ACTIVE | COMMUNITY

## 2023-09-07 RX ORDER — DILTIAZEM HYDROCHLORIDE 120 MG/1
CAPSULE, EXTENDED RELEASE ORAL
Qty: 180 CAPSULE | Status: ACTIVE | COMMUNITY

## 2023-09-07 RX ORDER — WARFARIN SODIUM 6 MG/1
TABLET ORAL
Qty: 78 TABLET | Status: ACTIVE | COMMUNITY

## 2023-09-07 RX ORDER — DIGOXIN 125 UG/1
TABLET ORAL
Qty: 45 TABLET | Status: ACTIVE | COMMUNITY

## 2023-09-07 RX ORDER — ATORVASTATIN CALCIUM 10 MG/1
TABLET, FILM COATED ORAL
Qty: 90 TABLET | Status: ACTIVE | COMMUNITY

## 2023-09-07 NOTE — HPI-TODAY'S VISIT:
88yo F afib on coumadin, with recurrent and chronic dysphagia dating back years, has had eGD in [place] years ago was told her esophagus was narrow.  She has trouble swallowing both liquids and solids and pills.  says that when she eats or drinks she feels like it pools in her throat high in her neck and then she has to do 3 swallows or drink more water for it to go down.  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  the speech therapy eval  Interval hx 9/7/23 she is doing well overall, had improvement since her EGD 4/2023 with dil of schatzki ring.  But she is now again in the past month or so having daily dysphagia with sense of pills and things pooling in back of her tongue.  we dsicussed this may be OP dysphagia as demonstrated on aspirations eva from 3/2023.  Plan will be to get esphogram to r/o esophageal lumen path.  if not refer to swallow therapy vs EGD

## 2023-09-21 ENCOUNTER — ESTABLISHED PATIENT (OUTPATIENT)
Dept: URBAN - METROPOLITAN AREA CLINIC 31 | Facility: CLINIC | Age: 88
End: 2023-09-21

## 2023-09-21 DIAGNOSIS — H18.593: ICD-10-CM

## 2023-09-21 PROCEDURE — 92012 INTRM OPH EXAM EST PATIENT: CPT

## 2023-09-21 ASSESSMENT — VISUAL ACUITY
OD_CC: 20/25
OS_CC: 20/200
OS_PH: 20/100-1

## 2023-09-21 ASSESSMENT — TONOMETRY
OD_IOP_MMHG: 11
OS_IOP_MMHG: 10

## 2023-10-12 ENCOUNTER — FOLLOW UP (OUTPATIENT)
Dept: URBAN - METROPOLITAN AREA CLINIC 31 | Facility: CLINIC | Age: 88
End: 2023-10-12

## 2023-10-12 DIAGNOSIS — H18.593: ICD-10-CM

## 2023-10-12 DIAGNOSIS — H40.1232: ICD-10-CM

## 2023-10-12 PROCEDURE — 99213 OFFICE O/P EST LOW 20 MIN: CPT

## 2023-10-12 ASSESSMENT — VISUAL ACUITY
OD_CC: J1
OS_CC: J1-1
OD_CC: 20/25-1
OS_CC: 20/20-1

## 2023-10-12 ASSESSMENT — TONOMETRY
OD_IOP_MMHG: 12
OS_IOP_MMHG: 11

## 2023-10-19 ENCOUNTER — TELEPHONE ENCOUNTER (OUTPATIENT)
Dept: URBAN - METROPOLITAN AREA CLINIC 7 | Facility: CLINIC | Age: 88
End: 2023-10-19

## 2023-10-24 ENCOUNTER — TELEPHONE ENCOUNTER (OUTPATIENT)
Dept: URBAN - METROPOLITAN AREA CLINIC 7 | Facility: CLINIC | Age: 88
End: 2023-10-24

## 2023-10-30 ENCOUNTER — TELEPHONE ENCOUNTER (OUTPATIENT)
Dept: URBAN - METROPOLITAN AREA CLINIC 7 | Facility: CLINIC | Age: 88
End: 2023-10-30

## 2023-10-30 ENCOUNTER — LAB OUTSIDE AN ENCOUNTER (OUTPATIENT)
Dept: URBAN - METROPOLITAN AREA CLINIC 7 | Facility: CLINIC | Age: 88
End: 2023-10-30

## 2023-11-14 ENCOUNTER — FOLLOW UP (OUTPATIENT)
Dept: URBAN - METROPOLITAN AREA CLINIC 31 | Facility: CLINIC | Age: 88
End: 2023-11-14

## 2023-11-14 DIAGNOSIS — H40.1232: ICD-10-CM

## 2023-11-14 DIAGNOSIS — H04.123: ICD-10-CM

## 2023-11-14 PROCEDURE — 92012 INTRM OPH EXAM EST PATIENT: CPT

## 2023-11-14 ASSESSMENT — TONOMETRY
OS_IOP_MMHG: 12
OD_IOP_MMHG: 13

## 2023-11-14 ASSESSMENT — VISUAL ACUITY
OD_CC: J2
OS_CC: 20/20
OS_CC: J1
OD_CC: 20/25-1

## 2023-12-14 ENCOUNTER — CLAIMS CREATED FROM THE CLAIM WINDOW (OUTPATIENT)
Dept: URBAN - METROPOLITAN AREA CLINIC 4 | Facility: CLINIC | Age: 88
End: 2023-12-14
Payer: MEDICARE

## 2023-12-14 ENCOUNTER — OFFICE VISIT (OUTPATIENT)
Dept: URBAN - METROPOLITAN AREA SURGERY CENTER 5 | Facility: SURGERY CENTER | Age: 88
End: 2023-12-14
Payer: MEDICARE

## 2023-12-14 DIAGNOSIS — K31.811: ICD-10-CM

## 2023-12-14 DIAGNOSIS — K31.811 ANGIODYSPLASIA OF STOMACH AND DUODENUM WITH BLEEDING: ICD-10-CM

## 2023-12-14 DIAGNOSIS — K22.2 SCHATZKI'S RING: ICD-10-CM

## 2023-12-14 DIAGNOSIS — K22.2 ESOPHAGEAL OBSTRUCTION: ICD-10-CM

## 2023-12-14 DIAGNOSIS — Z87.19 PERSONAL HISTORY OF OTHER DISEASES OF THE DIGESTIVE SYSTEM: ICD-10-CM

## 2023-12-14 DIAGNOSIS — K20.0 EOSINOPHILIC ESOPHAGITIS: ICD-10-CM

## 2023-12-14 PROCEDURE — 43248 EGD GUIDE WIRE INSERTION: CPT | Performed by: CLINIC/CENTER

## 2023-12-14 PROCEDURE — 88305 TISSUE EXAM BY PATHOLOGIST: CPT | Performed by: PATHOLOGY

## 2023-12-14 PROCEDURE — 43255 EGD CONTROL BLEEDING ANY: CPT | Performed by: INTERNAL MEDICINE

## 2023-12-14 PROCEDURE — 43255 EGD CONTROL BLEEDING ANY: CPT | Performed by: CLINIC/CENTER

## 2023-12-14 PROCEDURE — 88312 SPECIAL STAINS GROUP 1: CPT | Performed by: PATHOLOGY

## 2023-12-14 PROCEDURE — 43248 EGD GUIDE WIRE INSERTION: CPT | Performed by: INTERNAL MEDICINE

## 2023-12-14 PROCEDURE — 00731 ANES UPR GI NDSC PX NOS: CPT | Performed by: NURSE ANESTHETIST, CERTIFIED REGISTERED

## 2023-12-14 PROCEDURE — 88313 SPECIAL STAINS GROUP 2: CPT | Performed by: PATHOLOGY

## 2023-12-14 RX ORDER — ATORVASTATIN CALCIUM 10 MG/1
TABLET, FILM COATED ORAL
Qty: 90 TABLET | Status: ACTIVE | COMMUNITY

## 2023-12-14 RX ORDER — DILTIAZEM HYDROCHLORIDE 120 MG/1
CAPSULE, EXTENDED RELEASE ORAL
Qty: 180 CAPSULE | Status: ACTIVE | COMMUNITY

## 2023-12-14 RX ORDER — BISOPROLOL FUMARATE 5 MG/1
1 TABLET TABLET, FILM COATED ORAL ONCE A DAY
Status: ACTIVE | COMMUNITY

## 2023-12-14 RX ORDER — LATANOPROST 50 UG/ML
SOLUTION/ DROPS OPHTHALMIC
Qty: 7.5 MILLILITER | Status: ACTIVE | COMMUNITY

## 2023-12-14 RX ORDER — WARFARIN SODIUM 6 MG/1
TABLET ORAL
Qty: 78 TABLET | Status: ACTIVE | COMMUNITY

## 2023-12-14 RX ORDER — DIGOXIN 125 UG/1
TABLET ORAL
Qty: 45 TABLET | Status: ACTIVE | COMMUNITY

## 2024-01-09 ENCOUNTER — LAB OUTSIDE AN ENCOUNTER (OUTPATIENT)
Dept: URBAN - METROPOLITAN AREA SURGERY CENTER 5 | Facility: SURGERY CENTER | Age: 89
End: 2024-01-09

## 2024-01-10 ENCOUNTER — CLAIMS CREATED FROM THE CLAIM WINDOW (OUTPATIENT)
Dept: URBAN - METROPOLITAN AREA SURGERY CENTER 5 | Facility: SURGERY CENTER | Age: 89
End: 2024-01-10
Payer: MEDICARE

## 2024-01-10 DIAGNOSIS — K44.9 DIAPHRAGMATIC HERNIA WITHOUT OBSTRUCTION OR GANGRENE: ICD-10-CM

## 2024-01-10 DIAGNOSIS — K22.2 ESOPHAGEAL OBSTRUCTION: ICD-10-CM

## 2024-01-10 DIAGNOSIS — K22.2 SCHATZKI'S RING: ICD-10-CM

## 2024-01-10 DIAGNOSIS — K44.9 HIATAL HERNIA: ICD-10-CM

## 2024-01-10 PROCEDURE — 00731 ANES UPR GI NDSC PX NOS: CPT | Performed by: NURSE ANESTHETIST, CERTIFIED REGISTERED

## 2024-01-10 PROCEDURE — 43248 EGD GUIDE WIRE INSERTION: CPT | Performed by: INTERNAL MEDICINE

## 2024-01-10 PROCEDURE — 43248 EGD GUIDE WIRE INSERTION: CPT | Performed by: CLINIC/CENTER

## 2024-01-10 RX ORDER — LATANOPROST 50 UG/ML
SOLUTION/ DROPS OPHTHALMIC
Qty: 7.5 MILLILITER | Status: ACTIVE | COMMUNITY

## 2024-01-10 RX ORDER — ATORVASTATIN CALCIUM 10 MG/1
TABLET, FILM COATED ORAL
Qty: 90 TABLET | Status: ACTIVE | COMMUNITY

## 2024-01-10 RX ORDER — DILTIAZEM HYDROCHLORIDE 120 MG/1
CAPSULE, EXTENDED RELEASE ORAL
Qty: 180 CAPSULE | Status: ACTIVE | COMMUNITY

## 2024-01-10 RX ORDER — DIGOXIN 125 UG/1
TABLET ORAL
Qty: 45 TABLET | Status: ACTIVE | COMMUNITY

## 2024-01-10 RX ORDER — WARFARIN SODIUM 6 MG/1
TABLET ORAL
Qty: 78 TABLET | Status: ACTIVE | COMMUNITY

## 2024-01-10 RX ORDER — BISOPROLOL FUMARATE 5 MG/1
1 TABLET TABLET, FILM COATED ORAL ONCE A DAY
Status: ACTIVE | COMMUNITY

## 2024-05-15 ENCOUNTER — COMPREHENSIVE EXAM (OUTPATIENT)
Dept: URBAN - METROPOLITAN AREA CLINIC 31 | Facility: CLINIC | Age: 89
End: 2024-05-15

## 2024-05-15 DIAGNOSIS — H40.1232: ICD-10-CM

## 2024-05-15 DIAGNOSIS — H04.123: ICD-10-CM

## 2024-05-15 PROCEDURE — 92014 COMPRE OPH EXAM EST PT 1/>: CPT

## 2024-05-15 PROCEDURE — 92250 FUNDUS PHOTOGRAPHY W/I&R: CPT

## 2024-05-15 ASSESSMENT — VISUAL ACUITY
OU_SC: 20/20
OS_SC: 20/20-1
OS_CC: 20/20-1
OD_CC: 20/25
OS_CC: J2
OU_CC: J1
OD_SC: 20/25
OU_CC: 20/20-1
OD_CC: J2

## 2024-05-15 ASSESSMENT — TONOMETRY
OS_IOP_MMHG: 14
OD_IOP_MMHG: 15

## 2024-06-25 ENCOUNTER — ESTABLISHED PATIENT (OUTPATIENT)
Dept: URBAN - METROPOLITAN AREA CLINIC 31 | Facility: CLINIC | Age: 89
End: 2024-06-25

## 2024-06-25 DIAGNOSIS — H40.1232: ICD-10-CM

## 2024-06-25 DIAGNOSIS — H04.123: ICD-10-CM

## 2024-06-25 DIAGNOSIS — Z98.890: ICD-10-CM

## 2024-06-25 PROCEDURE — 92133 CPTRZD OPH DX IMG PST SGM ON: CPT

## 2024-06-25 PROCEDURE — 76514 ECHO EXAM OF EYE THICKNESS: CPT

## 2024-06-25 PROCEDURE — 99213 OFFICE O/P EST LOW 20 MIN: CPT

## 2024-06-25 PROCEDURE — 92083 EXTENDED VISUAL FIELD XM: CPT

## 2024-06-25 RX ORDER — LATANOPROST 50 UG/ML: 1 SOLUTION/ DROPS OPHTHALMIC EVERY EVENING

## 2024-06-25 ASSESSMENT — VISUAL ACUITY
OS_CC: 20/25
OU_CC: 20/20
OD_CC: 20/25

## 2024-06-25 ASSESSMENT — TONOMETRY
OS_IOP_MMHG: 13
OD_IOP_MMHG: 14

## 2024-06-25 ASSESSMENT — PACHYMETRY
OS_CT_UM: 584
OD_CT_UM: 603

## 2024-10-25 NOTE — ED PROVIDER NOTE - EYES, MLM
I approve of requested home care orders. OK to discharge but needs IV cares and TPN    Chuy Isaac MD     RYAN

## 2024-12-18 ENCOUNTER — FOLLOW UP (OUTPATIENT)
Age: 89
End: 2024-12-18

## 2024-12-18 DIAGNOSIS — H40.1232: ICD-10-CM

## 2024-12-18 DIAGNOSIS — H04.123: ICD-10-CM

## 2024-12-18 PROCEDURE — 99213 OFFICE O/P EST LOW 20 MIN: CPT

## 2025-01-08 NOTE — ED ADULT NURSE NOTE - PAIN RATING/NUMBER SCALE (0-10): REST
Patient called in and stated Dr Sanon is no longer her pain management doctor, and she hasn't seen the new one yet. She is in need of break through pain meds. She is having surgery on Friday 01/10/2025.   
0

## 2025-05-06 ENCOUNTER — LAB OUTSIDE AN ENCOUNTER (OUTPATIENT)
Dept: URBAN - METROPOLITAN AREA CLINIC 7 | Facility: CLINIC | Age: 89
End: 2025-05-06

## 2025-05-06 ENCOUNTER — TELEPHONE ENCOUNTER (OUTPATIENT)
Dept: URBAN - METROPOLITAN AREA CLINIC 7 | Facility: CLINIC | Age: 89
End: 2025-05-06

## 2025-05-06 ENCOUNTER — OFFICE VISIT (OUTPATIENT)
Dept: URBAN - METROPOLITAN AREA CLINIC 7 | Facility: CLINIC | Age: 89
End: 2025-05-06
Payer: MEDICARE

## 2025-05-06 DIAGNOSIS — R13.19 ESOPHAGEAL DYSPHAGIA: ICD-10-CM

## 2025-05-06 DIAGNOSIS — Q39.4 CRICOPHARYNGEAL WEB: ICD-10-CM

## 2025-05-06 DIAGNOSIS — I48.92 UNSPECIFIED ATRIAL FLUTTER: ICD-10-CM

## 2025-05-06 DIAGNOSIS — R93.3 GASTROINTESTINAL TRACT IMAGING ABNORMALITY: ICD-10-CM

## 2025-05-06 DIAGNOSIS — I48.91 UNSPECIFIED ATRIAL FIBRILLATION: ICD-10-CM

## 2025-05-06 DIAGNOSIS — R63.4 WEIGHT LOSS, UNINTENTIONAL: ICD-10-CM

## 2025-05-06 PROBLEM — 17366009: Status: ACTIVE | Noted: 2025-05-06

## 2025-05-06 PROBLEM — 442182001: Status: ACTIVE | Noted: 2025-05-06

## 2025-05-06 PROBLEM — 448765001: Status: ACTIVE | Noted: 2025-05-06

## 2025-05-06 PROBLEM — 5370000: Status: ACTIVE | Noted: 2025-05-06

## 2025-05-06 PROCEDURE — 99214 OFFICE O/P EST MOD 30 MIN: CPT | Performed by: INTERNAL MEDICINE

## 2025-05-06 RX ORDER — DIGOXIN 125 UG/1
1 TABLET TABLET ORAL
Qty: 15 | Status: ACTIVE | COMMUNITY
Start: 2025-05-06

## 2025-05-06 RX ORDER — LATANOPROST 50 UG/ML
1 DROP INTO AFFECTED EYE IN THE EVENING SOLUTION/ DROPS OPHTHALMIC ONCE A DAY
Status: ACTIVE | COMMUNITY
Start: 2025-05-06

## 2025-05-06 RX ORDER — LATANOPROST 50 UG/ML
SOLUTION/ DROPS OPHTHALMIC
Qty: 7.5 MILLILITER | Status: ACTIVE | COMMUNITY

## 2025-05-06 RX ORDER — ALLOPURINOL 100 MG/1
1 TABLET TABLET ORAL ONCE A DAY
Qty: 30 | Status: ACTIVE | COMMUNITY
Start: 2025-05-06

## 2025-05-06 RX ORDER — DIGOXIN 125 UG/1
TABLET ORAL
Qty: 45 TABLET | Status: ACTIVE | COMMUNITY

## 2025-05-06 RX ORDER — TORSEMIDE 10 MG/1
1 TABLET TABLET ORAL ONCE A DAY
Qty: 30 | Status: ACTIVE | COMMUNITY
Start: 2025-05-06

## 2025-05-06 RX ORDER — BISOPROLOL FUMARATE 5 MG/1
1 TABLET TABLET, FILM COATED ORAL ONCE A DAY
Status: ACTIVE | COMMUNITY

## 2025-05-06 RX ORDER — ATORVASTATIN CALCIUM 10 MG/1
1 TABLET TABLET, FILM COATED ORAL ONCE A DAY
Qty: 30 | Status: ACTIVE | COMMUNITY
Start: 2025-05-06

## 2025-05-06 RX ORDER — ATORVASTATIN CALCIUM 10 MG/1
TABLET ORAL
Qty: 90 TABLET | Status: ACTIVE | COMMUNITY

## 2025-05-06 RX ORDER — BISOPROLOL FUMARATE 5 MG/1
1 TABLET TABLET, FILM COATED ORAL ONCE A DAY
Qty: 30 | Status: ACTIVE | COMMUNITY
Start: 2025-05-06

## 2025-05-06 RX ORDER — DILTIAZEM HYDROCHLORIDE 120 MG/1
CAPSULE, EXTENDED RELEASE ORAL
Qty: 180 CAPSULE | Status: ACTIVE | COMMUNITY

## 2025-05-06 RX ORDER — WARFARIN SODIUM 6 MG/1
TABLET ORAL
Qty: 78 TABLET | Status: ACTIVE | COMMUNITY

## 2025-05-06 NOTE — HPI-TODAY'S VISIT:
Prior patient of Dr Steel with progressive and recurrent i dysphagia to solids. Symptoms are chronic but recently worse. Recent CT small hiatal hernia and thickening at GE junction with inability to r/o mass lesion. Did have prior egd with jocelin 2023 and subsequent swallow study that is reviewed. Had recent ENT eval. Recent likely food bolus impaction that cleared spontaneously . No difficulty with liquids. Obtains relief with repeated swallowing,taking of liquids and at times inducing regurgitation of the swallowed food material. No associated pulmonary complaints and no current symptoms of oropharyngeal dysphagia. Aprox 5lb weight loss but also reports that is having difficulty taking adequate calories. No fevers or chills. No bleeding. No night sweats. Has  no longstanding hx of GERD .

## 2025-05-28 ENCOUNTER — OFFICE VISIT (OUTPATIENT)
Dept: URBAN - METROPOLITAN AREA SURGERY CENTER 5 | Facility: SURGERY CENTER | Age: 89
End: 2025-05-28

## 2025-05-29 ENCOUNTER — OFFICE VISIT (OUTPATIENT)
Dept: URBAN - METROPOLITAN AREA SURGERY CENTER 5 | Facility: SURGERY CENTER | Age: 89
End: 2025-05-29
Payer: MEDICARE

## 2025-05-29 DIAGNOSIS — K22.2 ESOPHAGEAL STENOSIS: ICD-10-CM

## 2025-05-29 DIAGNOSIS — K44.9 DIAPHRAGMATIC HERNIA WITHOUT OBSTRUCTION OR GANGRENE: ICD-10-CM

## 2025-05-29 DIAGNOSIS — K31.89 OTHER DISEASES OF STOMACH AND DUODENUM: ICD-10-CM

## 2025-05-29 PROCEDURE — 43239 EGD BIOPSY SINGLE/MULTIPLE: CPT | Performed by: INTERNAL MEDICINE

## 2025-05-29 PROCEDURE — 43248 EGD GUIDE WIRE INSERTION: CPT | Performed by: INTERNAL MEDICINE

## 2025-05-29 PROCEDURE — 43248 EGD GUIDE WIRE INSERTION: CPT | Performed by: CLINIC/CENTER

## 2025-05-29 PROCEDURE — 43239 EGD BIOPSY SINGLE/MULTIPLE: CPT | Performed by: CLINIC/CENTER

## 2025-05-29 RX ORDER — ATORVASTATIN CALCIUM 10 MG/1
1 TABLET TABLET, FILM COATED ORAL ONCE A DAY
Qty: 30 | Status: ACTIVE | COMMUNITY
Start: 2025-05-06

## 2025-05-29 RX ORDER — BISOPROLOL FUMARATE 5 MG/1
1 TABLET TABLET, FILM COATED ORAL ONCE A DAY
Qty: 30 | Status: ACTIVE | COMMUNITY
Start: 2025-05-06

## 2025-05-29 RX ORDER — TORSEMIDE 10 MG/1
1 TABLET TABLET ORAL ONCE A DAY
Qty: 30 | Status: ACTIVE | COMMUNITY
Start: 2025-05-06

## 2025-05-29 RX ORDER — LATANOPROST 50 UG/ML
1 DROP INTO AFFECTED EYE IN THE EVENING SOLUTION/ DROPS OPHTHALMIC ONCE A DAY
Status: ACTIVE | COMMUNITY
Start: 2025-05-06

## 2025-05-29 RX ORDER — ATORVASTATIN CALCIUM 10 MG/1
TABLET ORAL
Qty: 90 TABLET | Status: ACTIVE | COMMUNITY

## 2025-05-29 RX ORDER — DILTIAZEM HYDROCHLORIDE 120 MG/1
CAPSULE, EXTENDED RELEASE ORAL
Qty: 180 CAPSULE | Status: ACTIVE | COMMUNITY

## 2025-05-29 RX ORDER — DIGOXIN 125 UG/1
TABLET ORAL
Qty: 45 TABLET | Status: ACTIVE | COMMUNITY

## 2025-05-29 RX ORDER — BISOPROLOL FUMARATE 5 MG/1
1 TABLET TABLET, FILM COATED ORAL ONCE A DAY
Status: ACTIVE | COMMUNITY

## 2025-05-29 RX ORDER — DIGOXIN 125 UG/1
1 TABLET TABLET ORAL
Qty: 15 | Status: ACTIVE | COMMUNITY
Start: 2025-05-06

## 2025-05-29 RX ORDER — ALLOPURINOL 100 MG/1
1 TABLET TABLET ORAL ONCE A DAY
Qty: 30 | Status: ACTIVE | COMMUNITY
Start: 2025-05-06

## 2025-05-29 RX ORDER — LATANOPROST 50 UG/ML
SOLUTION/ DROPS OPHTHALMIC
Qty: 7.5 MILLILITER | Status: ACTIVE | COMMUNITY

## 2025-05-29 RX ORDER — WARFARIN SODIUM 6 MG/1
TABLET ORAL
Qty: 78 TABLET | Status: ACTIVE | COMMUNITY

## 2025-06-16 ENCOUNTER — TELEPHONE ENCOUNTER (OUTPATIENT)
Dept: URBAN - METROPOLITAN AREA CLINIC 7 | Facility: CLINIC | Age: 89
End: 2025-06-16

## 2025-06-27 ENCOUNTER — TELEPHONE ENCOUNTER (OUTPATIENT)
Dept: URBAN - METROPOLITAN AREA CLINIC 7 | Facility: CLINIC | Age: 89
End: 2025-06-27